# Patient Record
Sex: MALE
[De-identification: names, ages, dates, MRNs, and addresses within clinical notes are randomized per-mention and may not be internally consistent; named-entity substitution may affect disease eponyms.]

---

## 2018-12-07 NOTE — PHYSICAL EXAM
[General Appearance - Well Developed] : well developed [Normal Appearance] : normal appearance [Well Groomed] : well groomed [General Appearance - Well Nourished] : well nourished [No Deformities] : no deformities [General Appearance - In No Acute Distress] : no acute distress [Normal Conjunctiva] : the conjunctiva exhibited no abnormalities [Eyelids - No Xanthelasma] : the eyelids demonstrated no xanthelasmas [Normal Oral Mucosa] : normal oral mucosa [No Oral Pallor] : no oral pallor [No Oral Cyanosis] : no oral cyanosis [Normal Jugular Venous A Waves Present] : normal jugular venous A waves present [Normal Jugular Venous V Waves Present] : normal jugular venous V waves present [No Jugular Venous Gutierrez A Waves] : no jugular venous gutierrez A waves [Respiration, Rhythm And Depth] : normal respiratory rhythm and effort [Exaggerated Use Of Accessory Muscles For Inspiration] : no accessory muscle use [Auscultation Breath Sounds / Voice Sounds] : lungs were clear to auscultation bilaterally [Heart Rate And Rhythm] : heart rate and rhythm were normal [Heart Sounds] : normal S1 and S2 [Murmurs] : no murmurs present [Abdomen Soft] : soft [Abdomen Tenderness] : non-tender [Abdomen Mass (___ Cm)] : no abdominal mass palpated [Abnormal Walk] : normal gait [Gait - Sufficient For Exercise Testing] : the gait was sufficient for exercise testing [Nail Clubbing] : no clubbing of the fingernails [Cyanosis, Localized] : no localized cyanosis [Petechial Hemorrhages (___cm)] : no petechial hemorrhages [Skin Color & Pigmentation] : normal skin color and pigmentation [] : no rash [No Venous Stasis] : no venous stasis [Skin Lesions] : no skin lesions [No Skin Ulcers] : no skin ulcer [No Xanthoma] : no  xanthoma was observed [Oriented To Time, Place, And Person] : oriented to person, place, and time [Affect] : the affect was normal [Mood] : the mood was normal [No Anxiety] : not feeling anxious

## 2018-12-10 ENCOUNTER — NON-APPOINTMENT (OUTPATIENT)
Age: 63
End: 2018-12-10

## 2018-12-10 ENCOUNTER — APPOINTMENT (OUTPATIENT)
Dept: HEART AND VASCULAR | Facility: CLINIC | Age: 63
End: 2018-12-10
Payer: MEDICARE

## 2018-12-10 VITALS
OXYGEN SATURATION: 99 % | WEIGHT: 165 LBS | TEMPERATURE: 98.1 F | HEIGHT: 72 IN | BODY MASS INDEX: 22.35 KG/M2 | HEART RATE: 57 BPM | DIASTOLIC BLOOD PRESSURE: 70 MMHG | SYSTOLIC BLOOD PRESSURE: 130 MMHG

## 2018-12-10 PROCEDURE — 93000 ELECTROCARDIOGRAM COMPLETE: CPT

## 2018-12-10 PROCEDURE — 99214 OFFICE O/P EST MOD 30 MIN: CPT | Mod: 25

## 2018-12-10 NOTE — REASON FOR VISIT
[Follow-Up - Clinic] : a clinic follow-up of [FreeTextEntry1] : Diagnostic Tests:\par ------------------------------------\par ECG:\par 12/10/18: sinus favian at 58bpm, 2 PVCs. \par 04/18/18: sinus bradycardia, otherwise normal. \par 04/11/17: sinus bradycardia, otherwise normal. \par -------------------------------------\par Echo:\par 05/01/18: EF 65%, dilated LA, aortic root 4.2cm, mild-moderate AI. \par 03/22/17: EF 60%, mild-moderate AI, mildly dilated LA, dilated ascending aorta 4.3cm.\par -------------------------------------\par CT:\par 12/04/17: CTA aorta: dilated aortic root and ascending aorta, 4.7cm at sinuses of Valsalva, 4.3 sino-tubular junction, 4.3 at RPA. \par 04/17/17: CTA aorta: dilated aortic root and ascending aorta, 4.7cm at sinuses of Valsalva.\par -------------------------------------\par Stress:\par 12/13/17: exercise stress echo: EF 62%, 14.8 METS, normal wall motion and PA pressures, aortic root 4.2cm, mild AI.

## 2018-12-10 NOTE — HISTORY OF PRESENT ILLNESS
[FreeTextEntry1] : Mr. Lipscomb presents for follow up and management of HIV, HTN, dyslipidemia, aortic regurgitation, and a dilated ascending aorta (4.7cm). His primary care provider, Arsenio Street NP, noted a murmur on cardiac auscultation. He was subsequently referred for an echocardiogram at [facility] on 03/22/17 which revealed an EF of 60%, a mildly dilated left atrium, mild-to-moderate aortic regurgitation, and a dilated ascending aorta measuring 4.3cm. He is very physically fit and exercises daily. He has resting bradycardia as a result of his cardiovascular fitness level. We had an extensive discussion about the results of the echocardiogram and the clinical importance of the dilated ascending aorta. I referred him to a cardiothoracic surgeon, Bc Banegas MD, who specialized in thoracic aortic surgery. He had a CT aortogram on 04/17/17 which revealed a dilated aortic root and ascending aorta measuring 4.7cm at sinuses of Valsalva and a repeat CTA on 12/04/17 which was unchanged.  He had complaints of chest pain and underwent an exercise stress echocardiogram on 12/13/17 which revealed an EF of 62%, 14.8 METS, normal wall motion and PA pressures, aortic root 4.2cm, and mild AI.  He was not able to tolerate low-dose Toprol XL secondary to an episode of weakness and near syncope. He had an echocardiogram on 05/01/18 which revealed an EF of 65%, dilated LA, aortic root 4.2cm, and mild-moderate AI.   He has complaints of left sided inguinal pain and will see a general surgeon at [facility] in the near future to r/o a hernia.

## 2018-12-10 NOTE — ASSESSMENT
[FreeTextEntry1] :  \par 1. HTN: BP not at ACC/AHA 2017 guideline target, not tolerant of low-dose beta blocker secondary to bradycardia\par             - continue telmisartan/hct 80/25 po daily\par 2. Hyperlipemia \par             - continue Lipitor Tablet, 20 MG, 1 tablet, Orally, Once a day (maximum dose given interaction with anti-retroviral therapy)\par \par 3. Aortic regurgitation: mild: s/p echo: 05/01/18: EF 65%, dilated LA, aortic root 4.2cm, mild-moderate AI. \par             - follow up with thoracic aortic center with Bc Banegas MD\par             - will follow with serial echocardiograms (will order next visit)\par \par 4. Dilated aortic root: dilated ascending aorta: 4.3cm (03/22/17), 04/17/17: CTA aorta: dilated aortic root and ascending aorta, 4.7cm at sinuses of Valsalva, 12/04/17: CTA aorta: dilated aortic root and ascending aorta, 4.7cm at sinuses of Valsalva, 4.3 sino-tubular junction, 4.3 at RPA, s/p echo: 05/01/18: EF 65%, dilated LA, aortic root 4.2cm, mild-moderate AI. \par             - not tolerant of low-dose beta blocker secondary to bradycardia\par             - continue following with thoracic aortic center with Bc Banegas MD\par             - will send for an echocardiogram next visit\par \par 5. HIV disease \par             - continue Norvir Capsule, 100 MG, 1 capsule with a meal, Orally, Once a day\par             - continue Sustiva Tablet, 600 MG, Orally, once daily\par             - continue Zovirax Tablet, 800 MG, 1 tablet, Orally, once a day\par             - continue Descovy Tablet, 200-25 MG, Orally\par             - f/u with HIV specialist.

## 2019-04-15 ENCOUNTER — APPOINTMENT (OUTPATIENT)
Dept: HEART AND VASCULAR | Facility: CLINIC | Age: 64
End: 2019-04-15
Payer: MEDICARE

## 2019-04-15 VITALS
BODY MASS INDEX: 22.89 KG/M2 | OXYGEN SATURATION: 100 % | WEIGHT: 169 LBS | HEIGHT: 72 IN | TEMPERATURE: 98 F | HEART RATE: 53 BPM | SYSTOLIC BLOOD PRESSURE: 124 MMHG | DIASTOLIC BLOOD PRESSURE: 63 MMHG

## 2019-04-15 DIAGNOSIS — Z00.00 ENCOUNTER FOR GENERAL ADULT MEDICAL EXAMINATION W/OUT ABNORMAL FINDINGS: ICD-10-CM

## 2019-04-15 PROCEDURE — 99214 OFFICE O/P EST MOD 30 MIN: CPT

## 2019-04-15 RX ORDER — TELMISARTAN AND HYDROCHLOROTHIAZIDE 80; 25 MG/1; MG/1
80-25 TABLET ORAL DAILY
Qty: 90 | Refills: 3 | Status: ACTIVE | COMMUNITY
Start: 1900-01-01 | End: 1900-01-01

## 2019-04-15 NOTE — ASSESSMENT
[FreeTextEntry1] : 1. HTN: BP at ACC/AHA 2017 guideline target, not tolerant of low-dose beta blocker secondary to bradycardia\par             - continue telmisartan/hct 80/25 po daily\par \par 2. Hyperlipemia \par             - continue Lipitor Tablet, 20 MG, 1 tablet, Orally, Once a day (maximum dose given interaction with anti-retroviral therapy)\par \par 3. Aortic regurgitation: mild: s/p echo: 05/01/18: EF 65%, dilated LA, aortic root 4.2cm, mild-moderate AI. \par             - enroll in [facility] thoracic aortic center with Eyal Jenkins MD\par             - will follow with serial echocardiograms (ordered today)\par \par 4. Dilated aortic root: dilated ascending aorta: 4.3cm (03/22/17), 04/17/17: CTA aorta: dilated aortic root and ascending aorta, 4.7cm at sinuses of Valsalva, 12/04/17: CTA aorta: dilated aortic root and ascending aorta, 4.7cm at sinuses of Valsalva, 4.3 sino-tubular junction, 4.3 at RPA, s/p echo: 05/01/18: EF 65%, dilated LA, aortic root 4.2cm, mild-moderate AI. \par             - not tolerant of low-dose beta blocker secondary to bradycardia\par             - will enroll in the [facility] thoracic aortic center with Eyal Jenkins MD\par             - will send for an echocardiogram \par             - will send for a CTCA to evaluate aortic aneurysm and coronary  atherosclerosis\par \par 5. HIV disease \par             - continue Norvir Capsule, 100 MG, 1 capsule with a meal, Orally, Once a day\par             - continue Sustiva Tablet, 600 MG, Orally, once daily\par             - continue Zovirax Tablet, 800 MG, 1 tablet, Orally, once a day\par             - continue Descovy Tablet, 200-25 MG, Orally\par             - f/u with HIV specialist.

## 2019-04-15 NOTE — HISTORY OF PRESENT ILLNESS
[FreeTextEntry1] : Mr. Lipscomb presents for follow up and management of HIV, HTN, dyslipidemia, aortic regurgitation, and a dilated ascending aorta (4.7cm). His primary care provider, Arsenio Street NP, noted a murmur on cardiac auscultation. He was subsequently referred for an echocardiogram at [facility] on 03/22/17 which revealed an EF of 60%, a mildly dilated left atrium, mild-to-moderate aortic regurgitation, and a dilated ascending aorta measuring 4.3cm. He is very physically fit and exercises daily. He has resting bradycardia as a result of his cardiovascular fitness level. We had an extensive discussion about the results of the echocardiogram and the clinical importance of the dilated ascending aorta. I referred him to a cardiothoracic surgeon, Bc Banegas MD, who specialized in thoracic aortic surgery. He had a CT aortogram on 04/17/17 which revealed a dilated aortic root and ascending aorta measuring 4.7cm at sinuses of Valsalva and a repeat CTA on 12/04/17 which was unchanged.  He had complaints of chest pain and underwent an exercise stress echocardiogram on 12/13/17 which revealed an EF of 62%, 14.8 METS, normal wall motion and PA pressures, aortic root 4.2cm, and mild AI.  He was not able to tolerate low-dose Toprol XL secondary to an episode of weakness and near syncope. He had an echocardiogram on 05/01/18 which revealed an EF of 65%, dilated LA, aortic root 4.2cm, and mild-moderate AI.  His HIV specialist may want to alter his ART regimen to help prevent the negative lipid effects.

## 2019-04-15 NOTE — REASON FOR VISIT
[Follow-Up - Clinic] : a clinic follow-up of [FreeTextEntry1] : HIV, HTN, dyslipidemia, aortic regurgitation, and a dilated ascending aorta (4.7cm).

## 2019-05-24 ENCOUNTER — OUTPATIENT (OUTPATIENT)
Dept: OUTPATIENT SERVICES | Facility: HOSPITAL | Age: 64
LOS: 1 days | End: 2019-05-24

## 2019-05-24 ENCOUNTER — APPOINTMENT (OUTPATIENT)
Dept: CT IMAGING | Facility: CLINIC | Age: 64
End: 2019-05-24
Payer: MEDICARE

## 2019-05-24 PROCEDURE — 71275 CT ANGIOGRAPHY CHEST: CPT | Mod: 26

## 2019-07-22 ENCOUNTER — APPOINTMENT (OUTPATIENT)
Dept: HEART AND VASCULAR | Facility: CLINIC | Age: 64
End: 2019-07-22
Payer: MEDICARE

## 2019-07-22 ENCOUNTER — NON-APPOINTMENT (OUTPATIENT)
Age: 64
End: 2019-07-22

## 2019-07-22 VITALS
OXYGEN SATURATION: 97 % | DIASTOLIC BLOOD PRESSURE: 77 MMHG | SYSTOLIC BLOOD PRESSURE: 133 MMHG | TEMPERATURE: 98.3 F | WEIGHT: 167.5 LBS | HEIGHT: 72 IN | BODY MASS INDEX: 22.69 KG/M2 | HEART RATE: 79 BPM

## 2019-07-22 PROCEDURE — 99214 OFFICE O/P EST MOD 30 MIN: CPT | Mod: 25

## 2019-07-22 PROCEDURE — 93000 ELECTROCARDIOGRAM COMPLETE: CPT

## 2019-07-22 NOTE — REASON FOR VISIT
[FreeTextEntry1] : Diagnostic Tests:\par ------------------------------------\par ECG:\par 07/22/19: marked sinus bradycardia at 46bpm, otherwise normal.  \par 12/10/18: sinus favian at 58bpm, 2 PVCs. \par 04/18/18: sinus bradycardia, otherwise normal. \par 04/11/17: sinus bradycardia, otherwise normal. \par -------------------------------------\par Echo:\par 05/01/18: EF 65%, dilated LA, aortic root 4.2cm, mild-moderate AI. \par 03/22/17: EF 60%, mild-moderate AI, mildly dilated LA, dilated ascending aorta 4.3cm.\par -------------------------------------\par CT:\par 05/24/19: CTA chest: aortic root 4.7cm, ascending aorta 4.2cm, moderate LA calcifications. \par 12/04/17: CTA aorta: dilated aortic root and ascending aorta, 4.7cm at sinuses of Valsalva, 4.3 sino-tubular junction, 4.3 at RPA. \par 04/17/17: CTA aorta: dilated aortic root and ascending aorta, 4.7cm at sinuses of Valsalva.\par -------------------------------------\par Stress:\par 12/13/17: exercise stress echo: EF 62%, 14.8 METS, normal wall motion and PA pressures, aortic root 4.2cm, mild AI.

## 2019-08-05 ENCOUNTER — MEDICATION RENEWAL (OUTPATIENT)
Age: 64
End: 2019-08-05

## 2019-08-08 ENCOUNTER — APPOINTMENT (OUTPATIENT)
Dept: HEART AND VASCULAR | Facility: CLINIC | Age: 64
End: 2019-08-08
Payer: MEDICARE

## 2019-08-08 PROCEDURE — 93351 STRESS TTE COMPLETE: CPT

## 2019-08-16 PROBLEM — Z21 HIV POSITIVE: Status: RESOLVED | Noted: 2019-08-16 | Resolved: 2019-08-16

## 2019-08-21 ENCOUNTER — APPOINTMENT (OUTPATIENT)
Dept: CARDIOTHORACIC SURGERY | Facility: CLINIC | Age: 64
End: 2019-08-21
Payer: MEDICARE

## 2019-08-21 VITALS
WEIGHT: 170 LBS | SYSTOLIC BLOOD PRESSURE: 143 MMHG | RESPIRATION RATE: 19 BRPM | TEMPERATURE: 98.1 F | HEART RATE: 49 BPM | HEIGHT: 72 IN | BODY MASS INDEX: 23.03 KG/M2 | OXYGEN SATURATION: 99 % | DIASTOLIC BLOOD PRESSURE: 89 MMHG

## 2019-08-21 DIAGNOSIS — Z21 ASYMPTOMATIC HUMAN IMMUNODEFICIENCY VIRUS [HIV] INFECTION STATUS: ICD-10-CM

## 2019-08-21 DIAGNOSIS — Z87.898 PERSONAL HISTORY OF OTHER SPECIFIED CONDITIONS: ICD-10-CM

## 2019-08-21 PROCEDURE — 99205 OFFICE O/P NEW HI 60 MIN: CPT

## 2019-08-21 NOTE — PHYSICAL EXAM
[General Appearance - In No Acute Distress] : in no acute distress [General Appearance - Alert] : alert [Outer Ear] : the ears and nose were normal in appearance [Sclera] : the sclera and conjunctiva were normal [] : no respiratory distress [Neck Appearance] : the appearance of the neck was normal [Apical Impulse] : the apical impulse was normal [Examination Of The Chest] : the chest was normal in appearance [2+] : left 2+ [No CVA Tenderness] : no ~M costovertebral angle tenderness [Skin Color & Pigmentation] : normal skin color and pigmentation [Skin Turgor] : normal skin turgor [Deep Tendon Reflexes (DTR)] : deep tendon reflexes were 2+ and symmetric [Oriented To Time, Place, And Person] : oriented to person, place, and time [Bowel Sounds] : normal bowel sounds [Abdomen Soft] : soft

## 2019-08-26 NOTE — PROCEDURE
[FreeTextEntry1] : \par Dr. Jenkins discussed activity restrictions with the patient, and would advise exercise at a moderate amount with no heavy lifting over one third of body weight, and avoiding heart rates that exceed 140 beats per minute. Every patient must abstain from tobacco and illicit drug use, especially stimulants such as cocaine or methamphetamine. The patient was also counseled on maintaining a healthy heart diet, and losing any excessive weight as this also put undue stress on both the aorta and entire cardiovascular system. Patient was counseled on the importance of medication adherence for blood pressure control, as hypertension is a significant risk factor associated with aortic dissections. First degree family members should be screened for bicuspid valve disease, and ascending aortic aneurysms.\par \par Dr. Jenkins reviewed the indications for surgery, and used our webpage [email or web address] to illustrate the aorta and anatomy of the heart. Those indications are the following: size greater than 5.0 cm, symptomatic aneurysms, family history of aortic dissection or aneurysm death with a size greater than 4.5 cm, other necessary cardiac procedures such as coronary artery bypass grafting or severe valvular disorders with an aneurysm greater than 4.5 cm, or connective tissue disorders with an aneurysm size greater than 4.5 cm. The patient does not meet size criteria for surgical intervention at the time.

## 2019-08-26 NOTE — CONSULT LETTER
[Dear  ___] : Dear  [unfilled], [Please see my note below.] : Please see my note below. [Sincerely,] : Sincerely, [FreeTextEntry2] : Dr. Nic Urias\par [address] [place] [address], [address]\[place] [address]\par  [FreeTextEntry1] : I had the pleasure of seeing your patient, ELIECER POTTER, in my office today. We take a multidisciplinary team approach to patient care and consider you, the referring physician, an extension of our team. We will maintain an open line of communication with you throughout your patient's treatment course.  \par \par He is being evaluated for an ascending aortic aneurysm. I have reviewed all of the medical records and diagnostic images at the time of his office visit. I have enclosed a copy for your records. \par \par I have reviewed the indications for surgery,and used our webpage [email or web address] <[email or web address]> to illustrate the aorta and anatomy of the heart. The patient does not meet size criteria for surgical intervention at the time. Therefore, I have recommended that the patient will require a follow up appointment in 1 year with a CTA chest to monitor aortic pathology. My office will assist the patient with his upcoming appointment and I will update you on his progress at that time.\par \par I have discussed with the patient that we will continue to monitor his aortic pathology closely at the [facility] at [facility] that encompasses the entire health care system and is one of the largest in the nation at this point.\par \par It is our commitment to provide your patient with the highest quality of advanced therapeutic options. On behalf of the Cardiothoracic Surgery Team, thank you for sending your patient to the [facility] based at [facility].  If there are any questions or concerns, please call me directly at [phone].  \par \par   [FreeTextEntry3] : \par Eyal Jenkins M.D.\par Professor of Cardiovascular and Thoracic Surgery\par Minimally Invasive Valve Surgeon\par Director of Aortic Surgery, [facility]\par Cell: [phone]\par Email: [email or web address] \par \par [facility]:\par [address], [address] Floor, [place] [address]\par Office: [phone]\par Fax: [phone]\par \par [facility]:\par Department of Cardiovascular and Thoracic Surgery\par [address] [place] [address], [address]\par Office: [phone]\par Fax: [phone]\par \par Practice Manager: Ms. Natacha Purcell\par Email: [email or web address]\par Phone: [phone]\par \par

## 2019-08-26 NOTE — HISTORY OF PRESENT ILLNESS
[FreeTextEntry1] : 64 yo male with a PMH of HIV (on treatment, last viral load undetectable), HTN, dyslipidemia, aortic regurgitation, and a dilated ascending aorta, who is referred to Dr. Jenkins for surgical consult.  Patient was referred by Dr. Nic Urias. \par \par 05/24/19: CTA chest: aortic root 4.7cm, ascending aorta 4.2cm, moderate LA calcifications. \par \par 08/08/19: exercise stress echo: EF 62%, dilated aortic root 4.3cm, dilated prox ascending aorta 4.6cm, mild AI, normal wall motion, PA pressures, and ECG post 13.9 METS. \par \par Patient denies any cardiopulmonary symptoms. Patient denies any fever, chills, fatigue, syncope, acute chest pain, palpitations, SOB, orthopnea, paroxysmal nocturnal dyspnea, vomiting, abdominal pain, back pain, BRBPR or swelling to legs. \par \par \par

## 2019-08-26 NOTE — ASSESSMENT
[FreeTextEntry1] : 62 yo male with a PMH of HIV (on treatment, last viral load undetectable), HTN, dyslipidemia, aortic regurgitation, and a dilated ascending aorta, who is referred to Dr. Jenkins for surgical consult.  \par \par The patient's medical records and diagnostic images were reviewed at the time of this office visit, and the following recommendation was made. Patient does not meet criteria for surgical intervention at the time and will be entered into the aortic registry surveillance program.\par \par Plan:\par 1. Continue medication regimen\par 2. Follow up with PCP and cardiologist\par 3. BP control- I have recommended the patient to monitor his blood pressure closely. I have also advised the patient to take daily blood pressures at home and adhere to medication regimen.\par 4. Return to the [facility] in 1 year with a CTA chest

## 2019-08-26 NOTE — DATA REVIEWED
[FreeTextEntry1] : Diagnostic Tests:\par ------------------------------------\par ECG:\par 07/22/19: marked sinus bradycardia at 46bpm, otherwise normal. \par 12/10/18: sinus favian at 58bpm, 2 PVCs. \par 04/18/18: sinus bradycardia, otherwise normal. \par 04/11/17: sinus bradycardia, otherwise normal. \par -------------------------------------\par Echo:\par 05/01/18: EF 65%, dilated LA, aortic root 4.2cm, mild-moderate AI. \par 03/22/17: EF 60%, mild-moderate AI, mildly dilated LA, dilated ascending aorta 4.3cm.\par -------------------------------------\par CT:\par 05/24/19: CTA chest: aortic root 4.7cm, ascending aorta 4.2cm, moderate LA calcifications. \par 12/04/17: CTA aorta: dilated aortic root and ascending aorta, 4.7cm at sinuses of Valsalva, 4.3 sino-tubular junction, 4.3 at RPA. \par 04/17/17: CTA aorta: dilated aortic root and ascending aorta, 4.7cm at sinuses of Valsalva.\par -------------------------------------\par Stress:\par 08/08/19: exercise stress echo: EF 62%, dilated aortic root 4.3cm, dilated prox ascending aorta 4.6cm, mild AI, normal wall motion, PA pressures, and ECG post 13.9 METS. \par 12/13/17: exercise stress echo: EF 62%, 14.8 METS, normal wall motion and PA pressures, aortic root 4.2cm, mild AI.\par -------------------------------------\par Monitor:\par 07/29/19 to 08/05/19: Lifewatch MCT: an HR 54, 79% bradycardia, no pauses. \par

## 2019-10-21 ENCOUNTER — APPOINTMENT (OUTPATIENT)
Dept: HEART AND VASCULAR | Facility: CLINIC | Age: 64
End: 2019-10-21
Payer: MEDICARE

## 2019-10-21 VITALS
OXYGEN SATURATION: 98 % | BODY MASS INDEX: 22.93 KG/M2 | HEART RATE: 55 BPM | WEIGHT: 169.31 LBS | HEIGHT: 72 IN | DIASTOLIC BLOOD PRESSURE: 66 MMHG | SYSTOLIC BLOOD PRESSURE: 125 MMHG

## 2019-10-21 PROCEDURE — 99214 OFFICE O/P EST MOD 30 MIN: CPT

## 2019-10-21 NOTE — REASON FOR VISIT
[FreeTextEntry1] : Diagnostic Tests:\par ------------------------------------\par ECG:\par 07/22/19: marked sinus bradycardia at 46bpm, otherwise normal.  \par 12/10/18: sinus favian at 58bpm, 2 PVCs. \par 04/18/18: sinus bradycardia, otherwise normal. \par 04/11/17: sinus bradycardia, otherwise normal. \par -------------------------------------\par Echo:\par 05/01/18: EF 65%, dilated LA, aortic root 4.2cm, mild-moderate AI. \par 03/22/17: EF 60%, mild-moderate AI, mildly dilated LA, dilated ascending aorta 4.3cm.\par -------------------------------------\par CT:\par 05/24/19: CTA chest: aortic root 4.7cm, ascending aorta 4.2cm, moderate LA calcifications. \par 12/04/17: CTA aorta: dilated aortic root and ascending aorta, 4.7cm at sinuses of Valsalva, 4.3 sino-tubular junction, 4.3 at RPA. \par 04/17/17: CTA aorta: dilated aortic root and ascending aorta, 4.7cm at sinuses of Valsalva.\par -------------------------------------\par Stress:\par 08/08/19: exercise stress echo: EF 62%, dilated aortic root 4.3cm, dilated prox ascending aorta 4.6cm, mild AI, normal wall motion, PA pressures, and ECG post 13.9 METS. \par 12/13/17: exercise stress echo: EF 62%, 14.8 METS, normal wall motion and PA pressures, aortic root 4.2cm, mild AI.\par -------------------------------------\par Monitor:\par 07/29/19 to 08/05/19:  Lifewatch MCT: an HR 54, 79% bradycardia, no pauses.

## 2019-10-21 NOTE — ASSESSMENT
[FreeTextEntry1] : 1. HTN: BP at ACC/AHA 2017 guideline target, not tolerant of low-dose beta blocker secondary to bradycardia\par             - continue telmisartan/hct 80/25mg po daily\par \par 2. Hyperlipemia: LDL mildly above goal\par             - continue Lipitor Tablet, 20 MG, 1 tablet, Orally, Once a day (maximum dose given interaction with antiretroviral therapy)\par \par 3. Aortic regurgitation: s/p 05/24/19: CTA chest: aortic root 4.7cm, ascending aorta 4.2cm, moderate LA calcifications. \par             - follow up [facility] thoracic aortic center with Eyal Jenkins MD\par             - will follow with serial echocardiograms \par \par 4. Dilated aortic root: dilated ascending aorta: s/p 05/24/19: CTA chest: aortic root 4.7cm, ascending aorta 4.2cm, moderate LA calcifications. \par             - not tolerant of low-dose beta blocker secondary to bradycardia\par             - will enroll in the [facility] thoracic aortic center with Eyal Jenkins MD\par             - will follow with serial echocardiograms and periodic CTA\par \par 5. HIV disease \par             - continue Norvir Capsule, 100 MG, 1 capsule with a meal, Orally, Once a day\par             - continue Sustiva Tablet, 600 MG, Orally, once daily\par             - continue Zovirax Tablet, 800 MG, 1 tablet, Orally, once a day\par             - continue Descovy Tablet, 200-25 MG, Orally\par             - f/u with HIV specialist. \par \par 6. Marked sinus bradycardia: likely secondary to high resting vagal tone from CV fitness: + occasional symptoms\par             - avoid HR slowing agents\par             - s/p 07/29/19 to 08/05/19: Lifewatch MCT: an HR 54, 79% bradycardia, no pauses. \par \par

## 2020-01-21 ENCOUNTER — NON-APPOINTMENT (OUTPATIENT)
Age: 65
End: 2020-01-21

## 2020-01-21 ENCOUNTER — APPOINTMENT (OUTPATIENT)
Dept: HEART AND VASCULAR | Facility: CLINIC | Age: 65
End: 2020-01-21
Payer: MEDICARE

## 2020-01-21 VITALS
SYSTOLIC BLOOD PRESSURE: 130 MMHG | OXYGEN SATURATION: 100 % | WEIGHT: 170 LBS | HEART RATE: 49 BPM | BODY MASS INDEX: 23.03 KG/M2 | TEMPERATURE: 98.5 F | HEIGHT: 72 IN | DIASTOLIC BLOOD PRESSURE: 69 MMHG

## 2020-01-21 PROCEDURE — 93000 ELECTROCARDIOGRAM COMPLETE: CPT

## 2020-01-21 PROCEDURE — 99214 OFFICE O/P EST MOD 30 MIN: CPT | Mod: 25

## 2020-01-21 NOTE — ASSESSMENT
[FreeTextEntry1] : 1. HTN: BP at ACC/AHA 2017 guideline target, not tolerant of low-dose beta blocker secondary to bradycardia\par             - continue telmisartan/hct 80/25mg po daily\par \par 2. Hyperlipemia: LDL mildly above goal\par             - continue Lipitor Tablet, 20 MG, 1 tablet, Orally, Once a day (maximum dose given interaction with antiretroviral therapy)\par \par 3. Aortic regurgitation: s/p 05/24/19: CTA chest: aortic root 4.7cm, ascending aorta 4.2cm, moderate LA calcifications.  \par             - follow up [facility] thoracic aortic center with Eyal Jenkins MD (08/2020)\par             - will follow with serial echocardiograms (ordered today)\par \par 4. Dilated aortic root: dilated ascending aorta: s/p 05/24/19: CTA chest: aortic root 4.7cm, ascending aorta 4.2cm, moderate LA calcifications. \par             - not tolerant of low-dose beta blocker secondary to bradycardia\par             - will enroll in the [facility] thoracic aortic center with Eyal Jenkins MD\par             - will follow with serial echocardiograms and periodic CTA\par \par 5. HIV disease \par             - continue Norvir Capsule, 100 MG, 1 capsule with a meal, Orally, Once a day\par             - continue Sustiva Tablet, 600 MG, Orally, once daily\par             - continue Zovirax Tablet, 800 MG, 1 tablet, Orally, once a day\par             - continue Descovy Tablet, 200-25 MG, Orally\par             - f/u with HIV specialist. \par \par 6. Marked sinus bradycardia: likely secondary to high resting vagal tone from CV fitness: + occasional symptoms\par             - avoid HR slowing agents\par             - s/p 07/29/19 to 08/05/19: Lifewatch MCT: an HR 54, 79% bradycardia, no pauses. \par \par

## 2020-01-21 NOTE — PHYSICAL EXAM
[General Appearance - Well Developed] : well developed [Normal Appearance] : normal appearance [Well Groomed] : well groomed [General Appearance - Well Nourished] : well nourished [No Deformities] : no deformities [General Appearance - In No Acute Distress] : no acute distress [Normal Conjunctiva] : the conjunctiva exhibited no abnormalities [Eyelids - No Xanthelasma] : the eyelids demonstrated no xanthelasmas [Normal Oral Mucosa] : normal oral mucosa [No Oral Pallor] : no oral pallor [No Oral Cyanosis] : no oral cyanosis [Normal Jugular Venous A Waves Present] : normal jugular venous A waves present [Normal Jugular Venous V Waves Present] : normal jugular venous V waves present [No Jugular Venous Gutierrez A Waves] : no jugular venous gutierrez A waves [Exaggerated Use Of Accessory Muscles For Inspiration] : no accessory muscle use [Respiration, Rhythm And Depth] : normal respiratory rhythm and effort [Heart Sounds] : normal S1 and S2 [Auscultation Breath Sounds / Voice Sounds] : lungs were clear to auscultation bilaterally [Abdomen Soft] : soft [Murmurs] : no murmurs present [Abdomen Tenderness] : non-tender [Abnormal Walk] : normal gait [Abdomen Mass (___ Cm)] : no abdominal mass palpated [Gait - Sufficient For Exercise Testing] : the gait was sufficient for exercise testing [Nail Clubbing] : no clubbing of the fingernails [Cyanosis, Localized] : no localized cyanosis [Petechial Hemorrhages (___cm)] : no petechial hemorrhages [Skin Color & Pigmentation] : normal skin color and pigmentation [] : no rash [No Venous Stasis] : no venous stasis [No Skin Ulcers] : no skin ulcer [Skin Lesions] : no skin lesions [Oriented To Time, Place, And Person] : oriented to person, place, and time [No Xanthoma] : no  xanthoma was observed [Affect] : the affect was normal [Mood] : the mood was normal [No Anxiety] : not feeling anxious [FreeTextEntry1] : + bradycardia

## 2020-01-21 NOTE — REASON FOR VISIT
[Follow-Up - Clinic] : a clinic follow-up of [FreeTextEntry1] : Diagnostic Tests:\par ------------------------------------\par ECG:\par 01/21/20: marked sinus bradycardia at 49bpm, otherwise normal. \par 07/22/19: marked sinus bradycardia at 46bpm, otherwise normal.  \par 12/10/18: sinus favian at 58bpm, 2 PVCs. \par 04/18/18: sinus bradycardia, otherwise normal. \par 04/11/17: sinus bradycardia, otherwise normal. \par -------------------------------------\par Echo:\par 05/01/18: EF 65%, dilated LA, aortic root 4.2cm, mild-moderate AI. \par 03/22/17: EF 60%, mild-moderate AI, mildly dilated LA, dilated ascending aorta 4.3cm.\par -------------------------------------\par CT:\par 05/24/19: CTA chest: aortic root 4.7cm, ascending aorta 4.2cm, moderate LA calcifications. \par 12/04/17: CTA aorta: dilated aortic root and ascending aorta, 4.7cm at sinuses of Valsalva, 4.3 sino-tubular junction, 4.3 at RPA. \par 04/17/17: CTA aorta: dilated aortic root and ascending aorta, 4.7cm at sinuses of Valsalva.\par -------------------------------------\par Stress:\par 08/08/19: exercise stress echo: EF 62%, dilated aortic root 4.3cm, dilated prox ascending aorta 4.6cm, mild AI, normal wall motion, PA pressures, and ECG post 13.9 METS. \par 12/13/17: exercise stress echo: EF 62%, 14.8 METS, normal wall motion and PA pressures, aortic root 4.2cm, mild AI.\par -------------------------------------\par Monitor:\par 07/29/19 to 08/05/19:  Lifewatch MCT: an HR 54, 79% bradycardia, no pauses.

## 2020-01-21 NOTE — HISTORY OF PRESENT ILLNESS
[FreeTextEntry1] : Mr. Lipscomb presents for follow up and management of HIV, HTN, dyslipidemia, aortic regurgitation, and a dilated ascending aorta (4.7cm). His primary care provider, Arsenio Street NP, noted a murmur on cardiac auscultation. He was subsequently referred for an echocardiogram at [facility] on 03/22/17 which revealed an EF of 60%, a mildly dilated left atrium, mild-to-moderate aortic regurgitation, and a dilated ascending aorta measuring 4.3cm. He is very physically fit and exercises daily. He has resting bradycardia as a result of his cardiovascular fitness level. We had an extensive discussion about the results of the echocardiogram and the clinical importance of the dilated ascending aorta. I referred him to a cardiothoracic surgeon, Bc Banegas MD, who specialized in thoracic aortic surgery. He had a CT aortogram on 04/17/17 which revealed a dilated aortic root and ascending aorta measuring 4.7cm at sinuses of Valsalva and a repeat CTA on 12/04/17 which was unchanged.  He had complaints of chest pain and underwent an exercise stress echocardiogram on 12/13/17 which revealed an EF of 62%, 14.8 METS, normal wall motion and PA pressures, aortic root 4.2cm, and mild AI.  He was not able to tolerate low-dose Toprol XL secondary to an episode of weakness and near syncope. He had an echocardiogram on 05/01/18 which revealed an EF of 65%, dilated LA, aortic root 4.2cm, and mild-moderate AI.  He had a cardiac CT on 05/24/19 which revealed aortic root 4.7cm, ascending aorta 4.2cm, and moderate LA calcifications. His HIV specialist may want to alter his ART regimen to help prevent the negative lipid effects.  He had a CTA chest on 05/24/19 which revealed an aortic root of 4.7cm, ascending aorta 4.2cm, moderate LA calcifications. He had an exercise stress echocardiogram on 08/08/19 which revealed an EF 62%, dilated aortic root 4.3cm, dilated prox ascending aorta 4.6cm, mild AI, normal wall motion, PA pressures, and ECG post 13.9 METS.   Since his last visit, he saw Hussein Jenkins MD and was enrolled in the thoracic aortic aneurysm surveillance center with a recommendation for repeat CTA in 1 year.  He has complaints of insomnia but does admit to taking a nap daily.

## 2020-06-01 ENCOUNTER — APPOINTMENT (OUTPATIENT)
Dept: HEART AND VASCULAR | Facility: CLINIC | Age: 65
End: 2020-06-01
Payer: MEDICARE

## 2020-06-01 VITALS
TEMPERATURE: 98.7 F | DIASTOLIC BLOOD PRESSURE: 71 MMHG | BODY MASS INDEX: 22.89 KG/M2 | SYSTOLIC BLOOD PRESSURE: 135 MMHG | OXYGEN SATURATION: 100 % | WEIGHT: 169 LBS | HEART RATE: 47 BPM | HEIGHT: 72 IN

## 2020-06-01 PROCEDURE — 99214 OFFICE O/P EST MOD 30 MIN: CPT

## 2020-06-01 PROCEDURE — 93306 TTE W/DOPPLER COMPLETE: CPT | Mod: 59

## 2020-06-01 RX ORDER — CYCLOSPORINE 0.5 MG/ML
0.05 EMULSION OPHTHALMIC
Qty: 180 | Refills: 0 | Status: ACTIVE | COMMUNITY
Start: 2020-05-29

## 2020-06-01 RX ORDER — ALBUTEROL SULFATE 90 UG/1
108 (90 BASE) INHALANT RESPIRATORY (INHALATION)
Qty: 18 | Refills: 0 | Status: ACTIVE | COMMUNITY
Start: 2020-04-01

## 2020-06-01 NOTE — PHYSICAL EXAM
[General Appearance - Well Developed] : well developed [Normal Appearance] : normal appearance [Well Groomed] : well groomed [General Appearance - Well Nourished] : well nourished [No Deformities] : no deformities [General Appearance - In No Acute Distress] : no acute distress [Normal Conjunctiva] : the conjunctiva exhibited no abnormalities [Eyelids - No Xanthelasma] : the eyelids demonstrated no xanthelasmas [Normal Oral Mucosa] : normal oral mucosa [No Oral Pallor] : no oral pallor [No Oral Cyanosis] : no oral cyanosis [Normal Jugular Venous A Waves Present] : normal jugular venous A waves present [Normal Jugular Venous V Waves Present] : normal jugular venous V waves present [No Jugular Venous Gutierrez A Waves] : no jugular venous gutierrez A waves [Respiration, Rhythm And Depth] : normal respiratory rhythm and effort [Exaggerated Use Of Accessory Muscles For Inspiration] : no accessory muscle use [Auscultation Breath Sounds / Voice Sounds] : lungs were clear to auscultation bilaterally [Heart Sounds] : normal S1 and S2 [Murmurs] : no murmurs present [Abdomen Soft] : soft [Abdomen Tenderness] : non-tender [Abdomen Mass (___ Cm)] : no abdominal mass palpated [Abnormal Walk] : normal gait [Gait - Sufficient For Exercise Testing] : the gait was sufficient for exercise testing [Nail Clubbing] : no clubbing of the fingernails [Cyanosis, Localized] : no localized cyanosis [Petechial Hemorrhages (___cm)] : no petechial hemorrhages [Skin Color & Pigmentation] : normal skin color and pigmentation [] : no rash [No Venous Stasis] : no venous stasis [Skin Lesions] : no skin lesions [No Skin Ulcers] : no skin ulcer [No Xanthoma] : no  xanthoma was observed [Oriented To Time, Place, And Person] : oriented to person, place, and time [Affect] : the affect was normal [Mood] : the mood was normal [No Anxiety] : not feeling anxious [FreeTextEntry1] : + bradycardia

## 2020-06-01 NOTE — REASON FOR VISIT
[Follow-Up - Clinic] : a clinic follow-up of [FreeTextEntry1] : Diagnostic Tests:\par ------------------------------------\par ECG:\par 01/21/20: marked sinus bradycardia at 49bpm, otherwise normal. \par 07/22/19: marked sinus bradycardia at 46bpm, otherwise normal.  \par 12/10/18: sinus favian at 58bpm, 2 PVCs. \par 04/18/18: sinus bradycardia, otherwise normal. \par 04/11/17: sinus bradycardia, otherwise normal. \par -------------------------------------\par Echo:\par 06/01/20: EF 66%, dilated aortic root 4.2cm, moderate AI.  \par 05/01/18: EF 65%, dilated LA, aortic root 4.2cm, mild-moderate AI. \par 03/22/17: EF 60%, mild-moderate AI, mildly dilated LA, dilated ascending aorta 4.3cm.\par -------------------------------------\par CT:\par 05/24/19: CTA chest: aortic root 4.7cm, ascending aorta 4.2cm, moderate LA calcifications. \par 12/04/17: CTA aorta: dilated aortic root and ascending aorta, 4.7cm at sinuses of Valsalva, 4.3 sino-tubular junction, 4.3 at RPA. \par 04/17/17: CTA aorta: dilated aortic root and ascending aorta, 4.7cm at sinuses of Valsalva.\par -------------------------------------\par Stress:\par 08/08/19: exercise stress echo: EF 62%, dilated aortic root 4.3cm, dilated prox ascending aorta 4.6cm, mild AI, normal wall motion, PA pressures, and ECG post 13.9 METS. \par 12/13/17: exercise stress echo: EF 62%, 14.8 METS, normal wall motion and PA pressures, aortic root 4.2cm, mild AI.\par -------------------------------------\par Monitor:\par 07/29/19 to 08/05/19:  Lifewatch MCT: an HR 54, 79% bradycardia, no pauses.

## 2020-06-01 NOTE — ASSESSMENT
[FreeTextEntry1] : 1. HTN: BP at ACC/AHA 2017 guideline target, not tolerant of low-dose beta blocker secondary to bradycardia\par             - continue telmisartan/hct 80/25mg po daily\par \par 2. Hyperlipemia: LDL mildly above goal\par             - continue Lipitor Tablet, 20 MG, 1 tablet, Orally, Once a day (maximum dose given interaction with antiretroviral therapy)\par \par 3. Aortic regurgitation: s/p echo: EF 66%, dilated aortic root 4.2cm, moderate AI. \par             - follow up [facility] thoracic aortic center with Eyal Jenkins MD (08/2020)\par             - will follow with serial echocardiograms \par \par 4. Dilated aortic root: dilated ascending aorta: s/p 05/24/19: CTA chest: aortic root 4.7cm, ascending aorta 4.2cm, moderate LA calcifications. \par             - not tolerant of low-dose beta blocker secondary to bradycardia\par             - follow up with [facility] thoracic aortic center with Eyal Jenkins MD\par             - will follow with serial echocardiograms and periodic CTA\par \par 5. HIV disease \par             - continue Norvir Capsule, 100 MG, 1 capsule with a meal, Orally, Once a day\par             - continue Sustiva Tablet, 600 MG, Orally, once daily\par             - continue Zovirax Tablet, 800 MG, 1 tablet, Orally, once a day\par             - continue Descovy Tablet, 200-25 MG, Orally\par             - f/u with HIV specialist. \par \par 6. Marked sinus bradycardia: likely secondary to high resting vagal tone from CV fitness: + occasional symptoms\par             - avoid HR slowing agents\par             - s/p 07/29/19 to 08/05/19: Lifewatch MCT: an HR 54, 79% bradycardia, no pauses. \par \par 7. Possible KERLINE: \par              - he will provide copies of his lab work\par \par

## 2020-06-01 NOTE — HISTORY OF PRESENT ILLNESS
[FreeTextEntry1] : Mr. Lipscomb presents for follow up and management of HIV, HTN, dyslipidemia, aortic regurgitation, and a dilated ascending aorta (4.7cm). His primary care provider, Arsenio Street NP, noted a murmur on cardiac auscultation. He was subsequently referred for an echocardiogram at [facility] on 03/22/17 which revealed an EF of 60%, a mildly dilated left atrium, mild-to-moderate aortic regurgitation, and a dilated ascending aorta measuring 4.3cm. He is very physically fit and exercises daily. He has resting bradycardia as a result of his cardiovascular fitness level. We had an extensive discussion about the results of the echocardiogram and the clinical importance of the dilated ascending aorta. I referred him to a cardiothoracic surgeon, Bc Banegas MD, who specialized in thoracic aortic surgery.  He had a CT aortogram on 04/17/17 which revealed a dilated aortic root and ascending aorta measuring 4.7cm at sinuses of Valsalva and a repeat CTA on 12/04/17 which was unchanged.  He had complaints of chest pain and underwent an exercise stress echocardiogram on 12/13/17 which revealed an EF of 62%, 14.8 METS, normal wall motion and PA pressures, aortic root 4.2cm, and mild AI.  He was not able to tolerate low-dose Toprol XL secondary to an episode of weakness and near syncope.  He had an echocardiogram on 05/01/18 which revealed an EF of 65%, dilated LA, aortic root 4.2cm, and mild-moderate AI.  He had a cardiac CT on 05/24/19 which revealed aortic root 4.7cm, ascending aorta 4.2cm, and moderate LA calcifications. His HIV specialist may want to alter his ART regimen to help prevent the negative lipid effects.  He had a CTA chest on 05/24/19 which revealed an aortic root of 4.7cm, ascending aorta 4.2cm, moderate LA calcifications. He had an exercise stress echocardiogram on 08/08/19 which revealed an EF 62%, dilated aortic root 4.3cm, dilated prox ascending aorta 4.6cm, mild AI, normal wall motion, PA pressures, and ECG post 13.9 METS.   He is now enrolled in the thoracic aortic aneurysm surveillance center.  He had an echocardiogram today (06/01/20) which revealed an EF of 66%, dilated aortic root 4.2cm, and moderate AI.  Since his last visit he has been well.  He will be seeing Dr. Jenkins this month.  His recent lab work revealed mild KERLINE (per patient) and he is following with his PMD.

## 2020-08-04 ENCOUNTER — RESULT REVIEW (OUTPATIENT)
Age: 65
End: 2020-08-04

## 2020-08-04 ENCOUNTER — APPOINTMENT (OUTPATIENT)
Dept: CT IMAGING | Facility: CLINIC | Age: 65
End: 2020-08-04
Payer: MEDICARE

## 2020-08-04 ENCOUNTER — OUTPATIENT (OUTPATIENT)
Dept: OUTPATIENT SERVICES | Facility: HOSPITAL | Age: 65
LOS: 1 days | End: 2020-08-04

## 2020-08-04 PROCEDURE — 71275 CT ANGIOGRAPHY CHEST: CPT | Mod: 26

## 2020-08-05 ENCOUNTER — APPOINTMENT (OUTPATIENT)
Dept: CARDIOTHORACIC SURGERY | Facility: CLINIC | Age: 65
End: 2020-08-05
Payer: MEDICARE

## 2020-08-05 VITALS
OXYGEN SATURATION: 99 % | RESPIRATION RATE: 18 BRPM | TEMPERATURE: 97 F | HEART RATE: 48 BPM | DIASTOLIC BLOOD PRESSURE: 79 MMHG | SYSTOLIC BLOOD PRESSURE: 148 MMHG

## 2020-08-05 PROCEDURE — 99214 OFFICE O/P EST MOD 30 MIN: CPT

## 2020-08-14 NOTE — PHYSICAL EXAM
[Neck Appearance] : the appearance of the neck was normal [Sclera] : the sclera and conjunctiva were normal [] : no respiratory distress [Apical Impulse] : the apical impulse was normal [Examination Of The Chest] : the chest was normal in appearance [2+] : left 2+ [Bowel Sounds] : normal bowel sounds [Abdomen Soft] : soft [Cervical Lymph Nodes Enlarged Posterior Bilaterally] : posterior cervical [No CVA Tenderness] : no ~M costovertebral angle tenderness [Abnormal Walk] : normal gait [Nail Clubbing] : no clubbing  or cyanosis of the fingernails [Musculoskeletal - Swelling] : no joint swelling seen [Motor Tone] : muscle strength and tone were normal [Skin Color & Pigmentation] : normal skin color and pigmentation [Skin Turgor] : normal skin turgor [Deep Tendon Reflexes (DTR)] : deep tendon reflexes were 2+ and symmetric [Oriented To Time, Place, And Person] : oriented to person, place, and time [General Appearance - Alert] : alert [General Appearance - In No Acute Distress] : in no acute distress [Outer Ear] : the ears and nose were normal in appearance [FreeTextEntry1] : deferred

## 2020-08-14 NOTE — CONSULT LETTER
[Dear  ___] : Dear  [unfilled], [FreeTextEntry1] : I had the pleasure of seeing your patient, ELIECER POTTER, in my office today. We take a multidisciplinary team approach to patient care and consider you, the referring physician, an extension of our team. We will maintain an open line of communication with you throughout your patient's treatment course.  \par \par Mr. POTTER presents for one year follow up visit for evaluation and management of thoracic aortic aneurysm. I have reviewed all of his medical records and diagnostic images at the time of his office consultation. I have enclosed a copy for your records. \par \par I have reviewed the indications for surgery,and used our webpage [email or web address] <[email or web address]> to illustrate the aorta and anatomy of the heart. The patient does not meet size criteria for surgical intervention at the time. Review of the imaging shows his  aortic pathology has remained stable. Therefore, I have recommended that the patient will require a follow up appointment in one year with CTA chest to monitor aortic pathology. My office will assist the patient with his upcoming appointment and I will update you on his progress at that time.\par \par I have discussed with the patient that we will continue to monitor his aortic pathology closely at the [facility] at [facility] that encompasses the entire health care system and is one of the largest in the nation at this point.\par \par It is our commitment to provide your patient with the highest quality of advanced therapeutic options. On behalf of the Cardiothoracic Surgery Team, thank you for sending your patient to the [facility] based at [facility].  If there are any questions or concerns, please call me directly at [phone].  \par \par Please see my note below. \par \par Sincerely, \par \par \par \par \par \par Eyal Jenkins M.D.\par Professor of Cardiovascular and Thoracic Surgery\par Minimally Invasive Valve Surgeon\par Director of Aortic Surgery, [facility]\par Cell: [phone]\par Email: [email or web address] \par \par [facility]:\par [address], [address], [place] [address]\par Office: [phone]\par Fax: [phone]\par \par [facility]:\par Department of Cardiovascular and Thoracic Surgery\par [address] [place] [address], [address]\par Office: [phone]\par Fax: [phone]\par \par Practice Manager: Ms. Natacha Purcell\par Email: [email or web address]\par Phone: [phone]\par  [FreeTextEntry2] : Nic Urias MD\par [address] [place] [address], [address]\[place] [address]\par

## 2020-08-14 NOTE — HISTORY OF PRESENT ILLNESS
[FreeTextEntry1] : 63 yo male with a PMH of HIV (on treatment, last viral load undetectable), HTN, dyslipidemia, aortic regurgitation, and a dilated ascending aorta, present today for a followup visit with repeat diagnostic imaging. \par \par CTA chest 8/4/20:\par 1.  Since May 24, 2019, unchanged thoracic aortic measurements as above. Unchanged dilatation aortic root, up to 4.7 cm.\par 2.  Moderate multivessel coronary artery calcific atherosclerosis.\par \par Echo 6/1/20: EF normal, moderate AR (mildly increased since 2018), aortic root 4.2cm. \par \par Patient is doing well and denies recent hospitalization, ER visits, or surgeries. He  denies fever, chills, fatigue, headache, blurred vision, dizziness, syncope, chest pain, palpitations, shortness of breath, orthopnea, paroxysmal nocturnal dyspnea, nausea, vomiting, abdominal pain, back pain, BRBPR or swelling to legs. He continues to bike/cardio exercise 3x/week without cardiopulmonary symptoms. \par \par

## 2020-08-14 NOTE — DATA REVIEWED
[FreeTextEntry1] : \par CTA chest 5/24/19: aortic root 4.7cm, ascending aorta 4.2cm, moderate LA calcifications. \par \par exercise stress echo 08/08/19: EF 62%, dilated aortic root 4.3cm, dilated prox ascending aorta 4.6cm, mild AI, normal wall motion, PA pressures, and ECG post 13.9 METS. \par \par Echo 6/1/20: EF normal, moderate AR (mildly increased since 2018), aortic root 4.2cm.

## 2020-08-14 NOTE — ASSESSMENT
[FreeTextEntry1] : 63 yo male with a PMH of HIV (on treatment, last viral load undetectable), HTN, dyslipidemia, aortic regurgitation, and a dilated ascending aorta, present today for a followup visit with repeat diagnostic imaging. \par \par The patient's medical records and diagnostic images were reviewed at the time of this office visit, and the following recommendation was made. Review of the imaging shows aortic pathology has remained stable and does not require surgical intervention at this time.\par \par Plan:\par 1. Continue medication regimen\par 2. Follow up with PCP and cardiologist\par 3. BP control- I have recommended the patient to monitor his blood pressure closely. I have also advised the patient to take daily blood pressures at home and adhere to medication regimen.\par 4. Return to the [facility] in one year with gated CTA chest. \par

## 2020-08-14 NOTE — END OF VISIT
[FreeTextEntry3] : \par I, BUBBA SALEEMU , am scribing for and in the presence of ABY ADORNO the following sections: History of present illness, past Medical/family/surgical/family/social history, review of systems, vital signs, physical exam and disposition.\par \par I personally performed the services described in the documentation, reviewed the documentation recorded by the scribe in my presence and it accurately and completely records my words and actions.\par \par \par \par

## 2020-08-14 NOTE — PROCEDURE
[FreeTextEntry1] : Dr. Jenkins reviewed the indications for surgery, and used our webpage [email or web address] <[email or web address]> to illustrate the aorta and anatomy of the heart. Those indications are the following: size greater than 5.0 cm, symptomatic aneurysms, family history of aortic dissection or aneurysm death with a size greater than 4.5 cm, other necessary cardiac procedures such as coronary artery bypass grafting or valvular disorders with an aneurysm greater than 4.5 cm, or connective tissue disorders with an aneurysm size greater than 4.5 cm. The patient does not meet size criteria for surgical intervention at the time.\par \par Dr. Jenkins discussed activity restrictions with the patient, and would advise exercise at a moderate amount with no heavy lifting over one third of body weight, and avoiding heart rates that exceed 140 beats per minute. In addition, every patient should abstain from tobacco abuse and to avoid all illicit drug use, especially stimulants such as cocaine or methamphetamine. Dr. Jenkins also counseled regarding maintaining a healthy heart diet, and losing any excessive weight as this also put undue stress on both the aorta and entire cardiovascular system. First degree family members should be screened for bicuspid valve disease, and ascending aortic aneurysms. \par \par Patient was advised to view the educational video prior to this visit regarding aortic pathology, risk factors, surgical procedures, and lifestyle modifications. Video can be retrieved at [email or web address].\par \par

## 2020-12-07 ENCOUNTER — APPOINTMENT (OUTPATIENT)
Dept: HEART AND VASCULAR | Facility: CLINIC | Age: 65
End: 2020-12-07
Payer: MEDICARE

## 2020-12-07 VITALS
HEART RATE: 71 BPM | HEIGHT: 73 IN | OXYGEN SATURATION: 100 % | SYSTOLIC BLOOD PRESSURE: 132 MMHG | WEIGHT: 175.6 LBS | DIASTOLIC BLOOD PRESSURE: 72 MMHG | BODY MASS INDEX: 23.27 KG/M2

## 2020-12-07 PROCEDURE — 99214 OFFICE O/P EST MOD 30 MIN: CPT

## 2020-12-07 PROCEDURE — 99072 ADDL SUPL MATRL&STAF TM PHE: CPT

## 2020-12-07 NOTE — ASSESSMENT
[FreeTextEntry1] : 1. HTN: BP near ACC/AHA 2017 guideline target, not tolerant of low-dose beta blocker secondary to bradycardia\par             - continue telmisartan/hct 80/25mg po daily\par \par 2. Hyperlipemia: LDL mildly above goal\par             - continue Lipitor Tablet, 20 MG, 1 tablet, Orally, Once a day (maximum dose given interaction with antiretroviral therapy)\par              - discussed therapeutic lifestyle changes to promote improved lipid metabolism \par \par 3. Aortic regurgitation: s/p echo: EF 66%, dilated aortic root 4.2cm, moderate AI, s/p CTA chest 08/02/20: aortic root 4.7cm, ascending aorta 4.2cm, moderate LA calcifications.\par             - follow up [facility] thoracic aortic center with Eyal Jenkins MD (08/2020)\par             - will follow with serial echocardiograms \par \par 4. Dilated aortic root: dilated ascending aorta: s/p 05/24/19: CTA chest: aortic root 4.7cm, ascending aorta 4.2cm, moderate LA calcifications. \par             - not tolerant of low-dose beta blocker secondary to bradycardia\par             - follow up with [facility] thoracic aortic center with Eyal Jenkins MD\par             - will follow with serial echocardiograms and periodic CTA\par \par 5. HIV disease:  \par             - continue Norvir Capsule, 100 MG, 1 capsule with a meal, Orally, Once a day\par             - continue Sustiva Tablet, 600 MG, Orally, once daily\par             - continue Zovirax Tablet, 800 MG, 1 tablet, Orally, once a day\par             - continue Descovy Tablet, 200-25 MG, Orally\par             - f/u with HIV specialist. \par \par 6. Marked sinus bradycardia: likely secondary to high resting vagal tone from CV fitness: + occasional symptoms\par             - avoid HR slowing agents\par             - s/p 07/29/19 to 08/05/19: Lifewatch MCT: an HR 54, 79% bradycardia, no pauses. \par \par 7. CKD III: Cr 1.43 (06/02/20)\par             - avoid nephrotoxic agents\par             - check repeat lab work next visit\par             - will send to nephrologist if Cr remains elevated\par \par

## 2020-12-07 NOTE — HISTORY OF PRESENT ILLNESS
[FreeTextEntry1] : Mr. Lipscomb presents for follow up and management of HIV, HTN, dyslipidemia, CKD III, aortic regurgitation, and a dilated ascending aorta (4.7cm). His primary care provider, Arsenio Street NP, noted a murmur on cardiac auscultation. He was subsequently referred for an echocardiogram at [facility] on 03/22/17 which revealed an EF of 60%, a mildly dilated left atrium, mild-to-moderate aortic regurgitation, and a dilated ascending aorta measuring 4.3cm. He is very physically fit and exercises daily. He has resting bradycardia as a result of his cardiovascular fitness level. We had an extensive discussion about the results of the echocardiogram and the clinical importance of the dilated ascending aorta. I referred him to a cardiothoracic surgeon, Bc Banegas MD, who specialized in thoracic aortic surgery.  He had a CT aortogram on 04/17/17 which revealed a dilated aortic root and ascending aorta measuring 4.7cm at sinuses of Valsalva and a repeat CTA on 12/04/17 which was unchanged.  He had complaints of chest pain and underwent an exercise stress echocardiogram on 12/13/17 which revealed an EF of 62%, 14.8 METS, normal wall motion and PA pressures, aortic root 4.2cm, and mild AI.  He was not able to tolerate low-dose Toprol XL secondary to an episode of weakness and near syncope.  He had an echocardiogram on 05/01/18 which revealed an EF of 65%, dilated LA, aortic root 4.2cm, and mild-moderate AI.  He had a cardiac CT on 05/24/19 which revealed aortic root 4.7cm, ascending aorta 4.2cm, and moderate LA calcifications. His HIV specialist may want to alter his ART regimen to help prevent the negative lipid effects.  He had a CTA chest on 05/24/19 which revealed an aortic root of 4.7cm, ascending aorta 4.2cm, moderate LA calcifications. He had an exercise stress echocardiogram on 08/08/19 which revealed an EF 62%, dilated aortic root 4.3cm, dilated prox ascending aorta 4.6cm, mild AI, normal wall motion, PA pressures, and ECG post 13.9 METS.  He is enrolled in the thoracic aortic aneurysm surveillance center.  He had an echocardiogram today (06/01/20) which revealed an EF of 66%, dilated aortic root 4.2cm, and moderate AI.  Since his last visit he has been well.   He has complaints of diffuse itching and is following with a dermatologist.

## 2020-12-07 NOTE — REASON FOR VISIT
[Follow-Up - Clinic] : a clinic follow-up of [FreeTextEntry1] : Diagnostic Tests:\par ------------------------------------\par ECG:\par 01/21/20: marked sinus bradycardia at 49bpm, otherwise normal. \par 07/22/19: marked sinus bradycardia at 46bpm, otherwise normal.  \par 12/10/18: sinus favian at 58bpm, 2 PVCs. \par 04/18/18: sinus bradycardia, otherwise normal. \par 04/11/17: sinus bradycardia, otherwise normal. \par -------------------------------------\par Echo:\par 06/01/20: EF 66%, dilated aortic root 4.2cm, moderate AI.  \par 05/01/18: EF 65%, dilated LA, aortic root 4.2cm, mild-moderate AI. \par 03/22/17: EF 60%, mild-moderate AI, mildly dilated LA, dilated ascending aorta 4.3cm.\par -------------------------------------\par CT:\par 08/04/20: CTA chest: aortic root 4.7cm, ascending aorta 4.2cm, moderate LA calcifications.\par 05/24/19: CTA chest: aortic root 4.7cm, ascending aorta 4.2cm, moderate LA calcifications. \par 12/04/17: CTA aorta: dilated aortic root and ascending aorta, 4.7cm at sinuses of Valsalva, 4.3 sino-tubular junction, 4.3 at RPA. \par 04/17/17: CTA aorta: dilated aortic root and ascending aorta, 4.7cm at sinuses of Valsalva.\par -------------------------------------\par Stress:\par 08/08/19: exercise stress echo: EF 62%, dilated aortic root 4.3cm, dilated prox ascending aorta 4.6cm, mild AI, normal wall motion, PA pressures, and ECG post 13.9 METS. \par 12/13/17: exercise stress echo: EF 62%, 14.8 METS, normal wall motion and PA pressures, aortic root 4.2cm, mild AI.\par -------------------------------------\par Monitor:\par 07/29/19 to 08/05/19:  Lifewatch MCT: an HR 54, 79% bradycardia, no pauses.

## 2021-04-06 ENCOUNTER — NON-APPOINTMENT (OUTPATIENT)
Age: 66
End: 2021-04-06

## 2021-04-06 ENCOUNTER — APPOINTMENT (OUTPATIENT)
Dept: HEART AND VASCULAR | Facility: CLINIC | Age: 66
End: 2021-04-06
Payer: MEDICARE

## 2021-04-06 VITALS
WEIGHT: 172 LBS | OXYGEN SATURATION: 98 % | HEART RATE: 51 BPM | DIASTOLIC BLOOD PRESSURE: 64 MMHG | TEMPERATURE: 98.3 F | BODY MASS INDEX: 22.8 KG/M2 | SYSTOLIC BLOOD PRESSURE: 121 MMHG | HEIGHT: 73 IN

## 2021-04-06 PROCEDURE — 99214 OFFICE O/P EST MOD 30 MIN: CPT | Mod: 25

## 2021-04-06 PROCEDURE — 93000 ELECTROCARDIOGRAM COMPLETE: CPT

## 2021-04-06 PROCEDURE — 99072 ADDL SUPL MATRL&STAF TM PHE: CPT

## 2021-04-06 NOTE — HISTORY OF PRESENT ILLNESS
[FreeTextEntry1] : Mr. Lipscomb presents for follow up and management of HIV, HTN, dyslipidemia, CKD III, aortic regurgitation, and a dilated ascending aorta (4.7cm). His primary care provider, Arsenio Street NP, noted a murmur on cardiac auscultation. He was subsequently referred for an echocardiogram at [facility] on 03/22/17 which revealed an EF of 60%, a mildly dilated left atrium, mild-to-moderate aortic regurgitation, and a dilated ascending aorta measuring 4.3cm. He is very physically fit and exercises daily. He has resting bradycardia as a result of his cardiovascular fitness level. We had an extensive discussion about the results of the echocardiogram and the clinical importance of the dilated ascending aorta. I referred him to a cardiothoracic surgeon, Bc Banegas MD, who specialized in thoracic aortic surgery.  He had a CT aortogram on 04/17/17 which revealed a dilated aortic root and ascending aorta measuring 4.7cm at sinuses of Valsalva and a repeat CTA on 12/04/17 which was unchanged.  He had complaints of chest pain and underwent an exercise stress echocardiogram on 12/13/17 which revealed an EF of 62%, 14.8 METS, normal wall motion and PA pressures, aortic root 4.2cm, and mild AI.  He was not able to tolerate low-dose Toprol XL secondary to an episode of weakness and near syncope.  He had an echocardiogram on 05/01/18 which revealed an EF of 65%, dilated LA, aortic root 4.2cm, and mild-moderate AI.  He had a cardiac CT on 05/24/19 which revealed aortic root 4.7cm, ascending aorta 4.2cm, and moderate LA calcifications. His HIV specialist may want to alter his ART regimen to help prevent the negative lipid effects.  He had a CTA chest on 05/24/19 which revealed an aortic root of 4.7cm, ascending aorta 4.2cm, moderate LA calcifications. He had an exercise stress echocardiogram on 08/08/19 which revealed an EF 62%, dilated aortic root 4.3cm, dilated prox ascending aorta 4.6cm, mild AI, normal wall motion, PA pressures, and ECG post 13.9 METS.  He is enrolled in the thoracic aortic aneurysm surveillance center.  He had an echocardiogram on 06/01/20 which revealed an EF of 66%, dilated aortic root 4.2cm, and moderate AI.  Since his last visit he has been well.  He has received both doses of the Pfizer COVID-19 vaccine.

## 2021-04-06 NOTE — REASON FOR VISIT
[FreeTextEntry1] : Diagnostic Tests:\par ------------------------------------\par ECG:\par 04/06/21: sinus bradycardia at 58 bpm, single PVC. \par 01/21/20: marked sinus bradycardia at 49bpm, otherwise normal. \par 07/22/19: marked sinus bradycardia at 46bpm, otherwise normal.  \par 12/10/18: sinus favian at 58bpm, 2 PVCs. \par 04/18/18: sinus bradycardia, otherwise normal. \par 04/11/17: sinus bradycardia, otherwise normal. \par -------------------------------------\par Echo:\par 06/01/20: EF 66%, dilated aortic root 4.2cm, moderate AI.  \par 05/01/18: EF 65%, dilated LA, aortic root 4.2cm, mild-moderate AI. \par 03/22/17: EF 60%, mild-moderate AI, mildly dilated LA, dilated ascending aorta 4.3cm.\par -------------------------------------\par CT:\par 08/04/20: CTA chest: aortic root 4.7cm, ascending aorta 4.2cm, moderate LA calcifications.\par 05/24/19: CTA chest: aortic root 4.7cm, ascending aorta 4.2cm, moderate LA calcifications. \par 12/04/17: CTA aorta: dilated aortic root and ascending aorta, 4.7cm at sinuses of Valsalva, 4.3 sino-tubular junction, 4.3 at RPA. \par 04/17/17: CTA aorta: dilated aortic root and ascending aorta, 4.7cm at sinuses of Valsalva.\par -------------------------------------\par Stress:\par 08/08/19: exercise stress echo: EF 62%, dilated aortic root 4.3cm, dilated prox ascending aorta 4.6cm, mild AI, normal wall motion, PA pressures, and ECG post 13.9 METS. \par 12/13/17: exercise stress echo: EF 62%, 14.8 METS, normal wall motion and PA pressures, aortic root 4.2cm, mild AI.\par -------------------------------------\par Monitor:\par 07/29/19 to 08/05/19:  Lifewatch MCT: an HR 54, 79% bradycardia, no pauses.

## 2021-04-06 NOTE — ASSESSMENT
[FreeTextEntry1] : 1. HTN: BP at ACC/AHA 2017 guideline target, not tolerant of low-dose beta blocker secondary to bradycardia\par             - continue telmisartan/hct 80/25mg po daily\par \par 2. Hyperlipemia: LDL at goal,  (06/02/20)\par             - continue atorvastatin 20mg po qd (maximum dose given interaction with antiretroviral therapy)\par             - discussed therapeutic lifestyle changes to promote improved lipid metabolism \par             - patient will provide copy of recent lab work from PMD's office for my review. \par \par 3. Aortic regurgitation: s/p echo: EF 66%, dilated aortic root 4.2cm, moderate AI, s/p CTA chest 08/02/20: aortic root 4.7cm, ascending aorta 4.2cm, moderate LA calcifications.\par             - follow up [facility] thoracic aortic center with Eyal Jenkins MD (08/2020)\par             - will follow with serial echocardiograms (ordered today)\par \par 4. Dilated aortic root: dilated ascending aorta: s/p 05/24/19: CTA chest: aortic root 4.7cm, ascending aorta 4.2cm, moderate LA calcifications. \par             - not tolerant of low-dose beta blocker secondary to bradycardia\par             - follow up with [facility] thoracic aortic center with Eyal Jenkins MD\par             - will follow with serial echocardiograms and periodic CTA (echocardiogram ordered today) \par \par 5. HIV disease:  \par             - continue Norvir Capsule, 100 MG, 1 capsule with a meal, Orally, Once a day\par             - continue Sustiva Tablet, 600 MG, Orally, once daily\par             - continue Zovirax Tablet, 800 MG, 1 tablet, Orally, once a day\par             - continue Descovy Tablet, 200-25 MG, Orally\par             - f/u with HIV specialist. \par \par 6. Marked sinus bradycardia: likely secondary to high resting vagal tone from CV fitness: + occasional symptoms\par             - avoid HR slowing agents\par             - s/p 07/29/19 to 08/05/19: Lifewatch MCT: an HR 54, 79% bradycardia, no pauses. \par \par 7. CKD II: Cr 1.16, GFR 66 (03/26/21)\par             - avoid nephrotoxic agents\par             - will send to nephrologist if Cr remains elevated\par \par 8. r/o SHLOMO\par              - will send for a carotid artery sonogram \par \par

## 2021-04-11 ENCOUNTER — NON-APPOINTMENT (OUTPATIENT)
Age: 66
End: 2021-04-11

## 2021-04-16 ENCOUNTER — APPOINTMENT (OUTPATIENT)
Dept: HEART AND VASCULAR | Facility: CLINIC | Age: 66
End: 2021-04-16
Payer: MEDICARE

## 2021-04-16 PROCEDURE — 93306 TTE W/DOPPLER COMPLETE: CPT

## 2021-04-16 PROCEDURE — 99072 ADDL SUPL MATRL&STAF TM PHE: CPT

## 2021-04-16 PROCEDURE — 93880 EXTRACRANIAL BILAT STUDY: CPT

## 2021-07-29 ENCOUNTER — TRANSCRIPTION ENCOUNTER (OUTPATIENT)
Age: 66
End: 2021-07-29

## 2021-07-30 ENCOUNTER — TRANSCRIPTION ENCOUNTER (OUTPATIENT)
Age: 66
End: 2021-07-30

## 2021-08-02 ENCOUNTER — RESULT REVIEW (OUTPATIENT)
Age: 66
End: 2021-08-02

## 2021-08-02 ENCOUNTER — TRANSCRIPTION ENCOUNTER (OUTPATIENT)
Age: 66
End: 2021-08-02

## 2021-08-02 ENCOUNTER — APPOINTMENT (OUTPATIENT)
Dept: CT IMAGING | Facility: CLINIC | Age: 66
End: 2021-08-02
Payer: MEDICARE

## 2021-08-02 ENCOUNTER — OUTPATIENT (OUTPATIENT)
Dept: OUTPATIENT SERVICES | Facility: HOSPITAL | Age: 66
LOS: 1 days | End: 2021-08-02

## 2021-08-02 PROCEDURE — 71275 CT ANGIOGRAPHY CHEST: CPT | Mod: 26

## 2021-08-04 ENCOUNTER — APPOINTMENT (OUTPATIENT)
Dept: CARDIOTHORACIC SURGERY | Facility: CLINIC | Age: 66
End: 2021-08-04
Payer: MEDICARE

## 2021-08-04 VITALS
RESPIRATION RATE: 17 BRPM | OXYGEN SATURATION: 99 % | DIASTOLIC BLOOD PRESSURE: 74 MMHG | TEMPERATURE: 97.5 F | WEIGHT: 166 LBS | SYSTOLIC BLOOD PRESSURE: 137 MMHG | HEIGHT: 73 IN | HEART RATE: 50 BPM | BODY MASS INDEX: 22 KG/M2

## 2021-08-04 PROCEDURE — 99213 OFFICE O/P EST LOW 20 MIN: CPT

## 2021-08-06 NOTE — END OF VISIT
[Time Spent: ___ minutes] : I have spent [unfilled] minutes of time on the encounter. [FreeTextEntry3] : \par I, BUBBA SALEEMU , am scribing for and in the presence of ABY ADORNO the following sections: History of present illness, past Medical/family/surgical/family/social history, review of systems, vital signs, physical exam and disposition.\par \par I personally performed the services described in the documentation, reviewed the documentation recorded by the scribe in my presence and it accurately and completely records my words and actions.\par \par

## 2021-08-06 NOTE — PROCEDURE
[FreeTextEntry1] : Dr. Jenkins reviewed the indications for surgery, and used our webpage [email or web address] <[email or web address]> to illustrate the aorta and anatomy of the heart. Those indications are the following: size greater than 5.0 cm, symptomatic aneurysms, family history of aortic dissection or aneurysm death with a size greater than 4.5 cm, other necessary cardiac procedures such as coronary artery bypass grafting or valvular disorders with an aneurysm greater than 4.5 cm, or connective tissue disorders with an aneurysm size greater than 4.5 cm. The patient does not meet size criteria for surgical intervention at the time.\par \par Dr. Jenkins discussed activity restrictions with the patient, and would advise exercise at a moderate amount with no heavy lifting over one third of body weight, and avoiding heart rates that exceed 140 beats per minute. In addition, every patient should abstain from tobacco abuse and to avoid all illicit drug use, especially stimulants such as cocaine or methamphetamine. Dr. Jenkins also counseled regarding maintaining a healthy heart diet, and losing any excessive weight as this also put undue stress on both the aorta and entire cardiovascular system. First degree family members should be screened for bicuspid valve disease, and ascending aortic aneurysms. \par \par Patient was advised to view the educational video prior to this visit regarding aortic pathology, risk factors, surgical procedures, and lifestyle modifications. Video can be retrieved at [email or web address].\par

## 2021-08-06 NOTE — ASSESSMENT
[FreeTextEntry1] : 65 year old male with a PMH of HIV (on treatment, last viral load undetectable), HTN, dyslipidemia, present today for a 1 year followup visit for evaluation and management of aortic regurgitation and a dilated ascending aorta with repeat diagnostic imaging. \par \par The patient's medical records and diagnostic images were reviewed at the time of this office visit, and the following recommendation was made. Review of the imaging shows aortic pathology has remained stable and does not require surgical intervention at this time.\par \par Plan:\par 1. Continue medication regimen\par 2. Follow up with PCP and cardiologist\par 3. BP control- I have recommended the patient to monitor his blood pressure closely. I have also advised the patient to take daily blood pressures at home and adhere to medication regimen.\par 4. Return to [facility] in one year with TTE and CTA chest. \par

## 2021-08-06 NOTE — PHYSICAL EXAM
[Sclera] : the sclera and conjunctiva were normal [Neck Appearance] : the appearance of the neck was normal [] : no respiratory distress [Apical Impulse] : the apical impulse was normal [Examination Of The Chest] : the chest was normal in appearance [2+] : left 2+ [Bowel Sounds] : normal bowel sounds [Abdomen Soft] : soft [Cervical Lymph Nodes Enlarged Posterior Bilaterally] : posterior cervical [No CVA Tenderness] : no ~M costovertebral angle tenderness [Abnormal Walk] : normal gait [Nail Clubbing] : no clubbing  or cyanosis of the fingernails [Musculoskeletal - Swelling] : no joint swelling seen [Motor Tone] : muscle strength and tone were normal [Skin Color & Pigmentation] : normal skin color and pigmentation [Skin Turgor] : normal skin turgor [Deep Tendon Reflexes (DTR)] : deep tendon reflexes were 2+ and symmetric [Oriented To Time, Place, And Person] : oriented to person, place, and time [General Appearance - Alert] : alert [General Appearance - In No Acute Distress] : in no acute distress [Outer Ear] : the ears and nose were normal in appearance [FreeTextEntry1] : deferred

## 2021-08-06 NOTE — DATA REVIEWED
[FreeTextEntry1] : \par CTA chest 5/24/19: aortic root 4.7cm, ascending aorta 4.2cm, moderate LA calcifications. \par \par exercise stress echo 08/08/19: EF 62%, dilated aortic root 4.3cm, dilated prox ascending aorta 4.6cm, mild AI, normal wall motion, PA pressures, and ECG post 13.9 METS. \par \par Echo 6/1/20: EF normal, moderate AR (mildly increased since 2018), aortic root 4.2cm. \par \par CTA chest 8/4/20:\par 1.  Since May 24, 2019, unchanged thoracic aortic measurements as above. Unchanged dilatation aortic root, up to 4.7 cm.\par 2.  Moderate multivessel coronary artery calcific atherosclerosis.\par

## 2021-08-06 NOTE — HISTORY OF PRESENT ILLNESS
[FreeTextEntry1] : 65 year old male with a PMH of HIV (on treatment, last viral load undetectable), HTN, dyslipidemia, present today for a 1 year followup visit for evaluation and management of aortic regurgitation and a dilated ascending aorta with repeat diagnostic imaging. \par \par Gated CTA chest 8/2/21: \par Aortic measurements as follows:\par *  Aortic root (sinuses of Valsalva): 4.9 x 4.7 cm (coronal by sagittal planes), previously 4.7 x 4.5 cm\par *  Mid ascending aorta: 4.4 x 4.4 cm, previously up to 4.2 cm\par *  Aortic arch: 3.1 cm sagittal plane\par *  Descending thoracic aorta (proximal): 2.4 cm sagittal plane\par *  Suprarenal aorta: 2.4 x 2.4 cm\par My measurement of the aortic root is 4.6-4.7cm; stable and no change from previous CT. \par \par TTE 4/16/21:\par structurally normal trileaflet aortic valve with normal opening. there is moderate to severe aortic regurgitation. EF 66%. LVIDd 4.82cm. \par \par Patient is doing well and denies recent hospitalization, ER visits, or surgeries. He  denies fever, chills, fatigue, headache, blurred vision, dizziness, syncope, chest pain, palpitations, shortness of breath, orthopnea, paroxysmal nocturnal dyspnea, nausea, vomiting, abdominal pain, back pain, BRBPR or swelling to legs.\par

## 2021-10-08 DIAGNOSIS — Z86.39 PERSONAL HISTORY OF OTHER ENDOCRINE, NUTRITIONAL AND METABOLIC DISEASE: ICD-10-CM

## 2021-10-08 DIAGNOSIS — Z86.79 PERSONAL HISTORY OF OTHER DISEASES OF THE CIRCULATORY SYSTEM: ICD-10-CM

## 2021-10-11 ENCOUNTER — APPOINTMENT (OUTPATIENT)
Dept: HEART AND VASCULAR | Facility: CLINIC | Age: 66
End: 2021-10-11
Payer: MEDICARE

## 2021-10-11 ENCOUNTER — NON-APPOINTMENT (OUTPATIENT)
Age: 66
End: 2021-10-11

## 2021-10-11 VITALS
SYSTOLIC BLOOD PRESSURE: 113 MMHG | OXYGEN SATURATION: 98 % | DIASTOLIC BLOOD PRESSURE: 57 MMHG | HEIGHT: 73 IN | WEIGHT: 172 LBS | BODY MASS INDEX: 22.8 KG/M2 | HEART RATE: 61 BPM

## 2021-10-11 PROCEDURE — 99214 OFFICE O/P EST MOD 30 MIN: CPT | Mod: 25

## 2021-10-11 PROCEDURE — 93000 ELECTROCARDIOGRAM COMPLETE: CPT

## 2021-10-11 RX ORDER — DOLUTEGRAVIR SODIUM AND RILPIVIRINE HYDROCHLORIDE 50; 25 MG/1; MG/1
50-25 TABLET, FILM COATED ORAL DAILY
Refills: 0 | Status: ACTIVE | COMMUNITY
Start: 2021-09-08

## 2021-10-11 NOTE — HISTORY OF PRESENT ILLNESS
[FreeTextEntry1] : Mr. Lipscomb presents for follow up and management of HIV, HTN, dyslipidemia, CKD III, aortic regurgitation, and a dilated ascending aorta (4.7cm). His primary care provider, Arsenio Street NP, noted a murmur on cardiac auscultation. He was subsequently referred for an echocardiogram at [facility] on 03/22/17 which revealed an EF of 60%, a mildly dilated left atrium, mild-to-moderate aortic regurgitation, and a dilated ascending aorta measuring 4.3cm. He is very physically fit and exercises daily. He has resting bradycardia as a result of his cardiovascular fitness level. We had an extensive discussion about the results of the echocardiogram and the clinical importance of the dilated ascending aorta. I referred him to a cardiothoracic surgeon, Bc Banegas MD, who specialized in thoracic aortic surgery.  He had a CT aortogram on 08/02/21 which revealed aortic root 4.9 cm, ascending aorta 4.4 cm, and moderate multivessel coronary calcifications. He was not able to tolerate low-dose Toprol XL secondary to an episode of weakness and near syncope.  He had an echocardiogram on 06/01/20 which revealed an EF of 66%, dilated aortic root 4.2cm, and moderate AI.  He had complaints of mid epigastric pain for several hours at a time over the course of 2-3 days several weeks ago and he would like to rule out the possibility of cholelithiasis.

## 2021-10-11 NOTE — REASON FOR VISIT
[Other: ____] : [unfilled] [FreeTextEntry1] : Diagnostic Tests:\par ------------------------------------\par ECG:\par 10/11/21: sinus rhythm, frequent APCs. \par 04/06/21: sinus bradycardia at 58 bpm, single PVC. \par 01/21/20: marked sinus bradycardia at 49bpm, otherwise normal. \par 07/22/19: marked sinus bradycardia at 46bpm, otherwise normal.  \par 12/10/18: sinus favian at 58bpm, 2 PVCs. \par 04/18/18: sinus bradycardia, otherwise normal. \par 04/11/17: sinus bradycardia, otherwise normal. \par -------------------------------------\par Echo:\par 04/16/21: EF 66%, aortic root 4.2 cm, mod-severe AI\par 06/01/20: EF 66%, dilated aortic root 4.2cm, moderate AI.  \par 05/01/18: EF 65%, dilated LA, aortic root 4.2cm, mild-moderate AI. \par 03/22/17: EF 60%, mild-moderate AI, mildly dilated LA, dilated ascending aorta 4.3cm.\par -------------------------------------\par CT:\par 08/02/21: CTA chest: aortic root 4.9 cm, ascending aorta 4.4 cm, moderate multivessel coronary calcifications.\par 08/04/20: CTA chest: aortic root 4.7cm, ascending aorta 4.2cm, moderate LA calcifications.\par 05/24/19: CTA chest: aortic root 4.7cm, ascending aorta 4.2cm, moderate LA calcifications. \par 12/04/17: CTA aorta: dilated aortic root and ascending aorta, 4.7cm at sinuses of Valsalva, 4.3 sino-tubular junction, 4.3 at RPA. \par 04/17/17: CTA aorta: dilated aortic root and ascending aorta, 4.7cm at sinuses of Valsalva.\par -------------------------------------\par Stress:\par 08/08/19: exercise stress echo: EF 62%, dilated aortic root 4.3cm, dilated prox ascending aorta 4.6cm, mild AI, normal wall motion, PA pressures, and ECG post 13.9 METS. \par 12/13/17: exercise stress echo: EF 62%, 14.8 METS, normal wall motion and PA pressures, aortic root 4.2cm, mild AI.\par -------------------------------------\par Vascular:\par 04/16/21: Carotid: 1-19% stenosis b/l\par -------------------------------------\par Monitor:\par 07/29/19 to 08/05/19:  Lifewatch MCT: an HR 54, 79% bradycardia, no pauses.

## 2021-10-11 NOTE — ASSESSMENT
[FreeTextEntry1] : 1. HTN: BP at ACC/AHA 2017 guideline target, not tolerant of low-dose beta blocker secondary to bradycardia\par             - continue telmisartan/hct 80/25mg po daily\par \par 2. Hyperlipemia: LDL at goal,  (10/5/21): off statins\par             - continue off statins at this time given new ART regimen\par \par 3. Aortic regurgitation: s/p echo: 04/16/21: EF 66%, aortic root 4.2 cm, mod-severe AI\par             - follow up [facility] thoracic aortic center with Eyal Jenkins MD (08/2020)\par             - will follow with serial echocardiograms\par \par 4. Dilated aortic root: dilated ascending aorta: s/p 04/16/21: EF 66%, aortic root 4.2 cm, mod-severe AI\par             - not tolerant of low-dose beta blocker secondary to bradycardia\par             - follow up with [facility] thoracic aortic center with Eyal Jenkins MD\par             - will follow with serial echocardiograms and periodic CTA \par \par 5. HIV disease:  \par             - continue Juluca 50/25mg po qd \par             - f/u with HIV specialist\par \par 6. Marked sinus bradycardia: likely secondary to high resting vagal tone from CV fitness: + occasional symptoms\par             - avoid HR slowing agents\par             - s/p 07/29/19 to 08/05/19: Lifewatch MCT: an HR 54, 79% bradycardia, no pauses. \par \par 7. CKD II: Cr 1.12 (10/05/21)\par             - avoid nephrotoxic agents\par             - will consider referral to nephrology if CKD worsens\par \par 8. CAD: coronary artery calcifications noted on CT aortogram: \par            - he would like to continue off statins at this time\par            - will send for a coronary artery angiogram when time for aorta/aortic valve intervention\par   \par 9. Abdominal pain:  1 episode: CT chest with upper abdominal cuts was normal (08/2021)\par             - will send for an abdominal sonogram \par \par An ECG was obtained to evaluate heart rhythm and screen for any underlying cardiac abnormalities. \par

## 2021-10-14 ENCOUNTER — OUTPATIENT (OUTPATIENT)
Dept: OUTPATIENT SERVICES | Facility: HOSPITAL | Age: 66
LOS: 1 days | End: 2021-10-14

## 2021-10-14 ENCOUNTER — APPOINTMENT (OUTPATIENT)
Dept: ULTRASOUND IMAGING | Facility: CLINIC | Age: 66
End: 2021-10-14
Payer: MEDICARE

## 2021-10-14 PROCEDURE — 76700 US EXAM ABDOM COMPLETE: CPT | Mod: 26

## 2022-04-11 ENCOUNTER — NON-APPOINTMENT (OUTPATIENT)
Age: 67
End: 2022-04-11

## 2022-04-11 ENCOUNTER — APPOINTMENT (OUTPATIENT)
Dept: HEART AND VASCULAR | Facility: CLINIC | Age: 67
End: 2022-04-11
Payer: MEDICARE

## 2022-04-11 VITALS
OXYGEN SATURATION: 99 % | DIASTOLIC BLOOD PRESSURE: 64 MMHG | TEMPERATURE: 98.7 F | SYSTOLIC BLOOD PRESSURE: 138 MMHG | HEART RATE: 63 BPM | HEIGHT: 73 IN | BODY MASS INDEX: 22.8 KG/M2 | WEIGHT: 172 LBS

## 2022-04-11 DIAGNOSIS — B20 HUMAN IMMUNODEFICIENCY VIRUS [HIV] DISEASE: ICD-10-CM

## 2022-04-11 DIAGNOSIS — Z87.898 PERSONAL HISTORY OF OTHER SPECIFIED CONDITIONS: ICD-10-CM

## 2022-04-11 PROCEDURE — 93000 ELECTROCARDIOGRAM COMPLETE: CPT

## 2022-04-11 PROCEDURE — 99214 OFFICE O/P EST MOD 30 MIN: CPT | Mod: 25

## 2022-04-11 NOTE — ASSESSMENT
[FreeTextEntry1] : 1. HTN: BP at ACC/AHA 2017 guideline target, not tolerant of low-dose beta blocker secondary to bradycardia\par             - continue telmisartan/hct 80/25mg po daily\par \par 2. Hyperlipemia: LDL at goal,  (04/05/22): off statins\par             - continue off statins at this time given new ART regimen\par             - patient will provide copy of recent lab work from PMD's office for my review \par \par 3. Aortic regurgitation: s/p echo: 04/16/21: EF 66%, aortic root 4.2 cm, mod-severe AI\par             - follow up [facility] thoracic aortic center with Eyal Jenkins MD (08/2020)\par             - will follow with serial echocardiograms (ordered today) \par \par 4. Dilated aortic root: dilated ascending aorta: s/p 04/16/21: EF 66%, aortic root 4.2 cm, mod-severe AI\par             - not tolerant of low-dose beta blocker secondary to bradycardia\par             - follow up with [facility] thoracic aortic center with Eyal Jenkins MD\par             - will follow with serial echocardiograms and periodic CTA \par \par 5. HIV disease:  \par             - continue Juluca 50/25mg po qd \par             - f/u with HIV specialist\par \par 6. Marked sinus bradycardia: likely secondary to high resting vagal tone from CV fitness: + occasional symptoms\par             - avoid HR slowing agents\par             - s/p 07/29/19 to 08/05/19: Lifewatch MCT: an HR 54, 79% bradycardia, no pauses. \par \par 7. CKD II: Cr 1.33, GFR 56 (04/05/22)\par             - avoid nephrotoxic agents\par             - will consider referral to nephrology if CKD worsens\par \par 8. CAD: coronary artery calcifications noted on CT aortogram: \par            - he would like to continue off statins at this time\par            - will send for a coronary artery angiogram when time for aorta/aortic valve intervention\par   \par 9. Abdominal pain:  1 episode: CT chest with upper abdominal cuts was normal (08/2021)\par             - will send for an abdominal sonogram \par \par \par An ECG was obtained to evaluate heart rhythm and screen for any underlying cardiac abnormalities. \par

## 2022-04-11 NOTE — HISTORY OF PRESENT ILLNESS
[FreeTextEntry1] : Mr. Lipscomb presents for follow up and management of HIV, HTN, dyslipidemia, CKD III, aortic regurgitation, and a dilated ascending aorta (4.7cm). His primary care provider, Arsenio Street NP, noted a murmur on cardiac auscultation. He was subsequently referred for an echocardiogram at [facility] on 03/22/17 which revealed an EF of 60%, a mildly dilated left atrium, mild-to-moderate aortic regurgitation, and a dilated ascending aorta measuring 4.3cm. He is very physically fit and exercises daily. He has resting bradycardia as a result of his cardiovascular fitness level. We had an extensive discussion about the results of the echocardiogram and the clinical importance of the dilated ascending aorta. I referred him to a cardiothoracic surgeon, Bc Banegas MD, who specialized in thoracic aortic surgery.  He had a CT aortogram on 08/02/21 which revealed aortic root 4.9 cm, ascending aorta 4.4 cm, and moderate multivessel coronary calcifications. He was not able to tolerate low-dose Toprol XL secondary to an episode of weakness and near syncope.  He had an echocardiogram on 04/16/21 which revealed an EF of 66%, aortic root 4.2 cm, and mod-severe AI.  His lab work from 04/05/22 revealed a Cr 1.33 and a GFR of 56.  We discussed the importance of following up his renal function and seeing a nephrologist if his repeat lab work reveals a persistently elevated creatinine. \par \par *******\par He prefers 6 month f/u

## 2022-04-11 NOTE — REASON FOR VISIT
[Other: ____] : [unfilled] [FreeTextEntry1] : Diagnostic Tests:\par ------------------------------------\par ECG:\par 04/11/22: sinus rhythm, frequent APCs. \par 10/11/21: sinus rhythm, frequent APCs. \par 04/06/21: sinus bradycardia at 58 bpm, single PVC. \par 01/21/20: marked sinus bradycardia at 49bpm, otherwise normal. \par 07/22/19: marked sinus bradycardia at 46bpm, otherwise normal.  \par 12/10/18: sinus favian at 58bpm, 2 PVCs. \par 04/18/18: sinus bradycardia, otherwise normal. \par 04/11/17: sinus bradycardia, otherwise normal. \par -------------------------------------\par Echo:\par 04/16/21: EF 66%, aortic root 4.2 cm, mod-severe AI\par 06/01/20: EF 66%, dilated aortic root 4.2cm, moderate AI.  \par 05/01/18: EF 65%, dilated LA, aortic root 4.2cm, mild-moderate AI. \par 03/22/17: EF 60%, mild-moderate AI, mildly dilated LA, dilated ascending aorta 4.3cm.\par -------------------------------------\par CT:\par 08/02/21: CTA chest: aortic root 4.9 cm, ascending aorta 4.4 cm, moderate multivessel coronary calcifications.\par 08/04/20: CTA chest: aortic root 4.7cm, ascending aorta 4.2cm, moderate LA calcifications.\par 05/24/19: CTA chest: aortic root 4.7cm, ascending aorta 4.2cm, moderate LA calcifications. \par 12/04/17: CTA aorta: dilated aortic root and ascending aorta, 4.7cm at sinuses of Valsalva, 4.3 sino-tubular junction, 4.3 at RPA. \par 04/17/17: CTA aorta: dilated aortic root and ascending aorta, 4.7cm at sinuses of Valsalva.\par -------------------------------------\par Stress:\par 08/08/19: exercise stress echo: EF 62%, dilated aortic root 4.3cm, dilated prox ascending aorta 4.6cm, mild AI, normal wall motion, PA pressures, and ECG post 13.9 METS. \par 12/13/17: exercise stress echo: EF 62%, 14.8 METS, normal wall motion and PA pressures, aortic root 4.2cm, mild AI.\par -------------------------------------\par Vascular:\par 04/16/21: Carotid: 1-19% stenosis b/l\par -------------------------------------\par Monitor:\par 07/29/19 to 08/05/19:  Lifewatch MCT: an HR 54, 79% bradycardia, no pauses.

## 2022-05-05 ENCOUNTER — NON-APPOINTMENT (OUTPATIENT)
Age: 67
End: 2022-05-05

## 2022-05-05 ENCOUNTER — APPOINTMENT (OUTPATIENT)
Dept: HEART AND VASCULAR | Facility: CLINIC | Age: 67
End: 2022-05-05
Payer: MEDICARE

## 2022-05-05 PROCEDURE — 93306 TTE W/DOPPLER COMPLETE: CPT

## 2022-07-28 ENCOUNTER — APPOINTMENT (OUTPATIENT)
Dept: CT IMAGING | Facility: CLINIC | Age: 67
End: 2022-07-28

## 2022-07-28 ENCOUNTER — OUTPATIENT (OUTPATIENT)
Dept: OUTPATIENT SERVICES | Facility: HOSPITAL | Age: 67
LOS: 1 days | End: 2022-07-28

## 2022-07-28 PROCEDURE — 71275 CT ANGIOGRAPHY CHEST: CPT | Mod: 26

## 2022-08-03 ENCOUNTER — APPOINTMENT (OUTPATIENT)
Dept: CARDIOTHORACIC SURGERY | Facility: CLINIC | Age: 67
End: 2022-08-03

## 2022-08-03 VITALS
OXYGEN SATURATION: 96 % | TEMPERATURE: 97.2 F | WEIGHT: 170 LBS | SYSTOLIC BLOOD PRESSURE: 121 MMHG | DIASTOLIC BLOOD PRESSURE: 63 MMHG | BODY MASS INDEX: 22.53 KG/M2 | RESPIRATION RATE: 17 BRPM | HEIGHT: 73 IN | HEART RATE: 56 BPM

## 2022-08-03 PROCEDURE — 99214 OFFICE O/P EST MOD 30 MIN: CPT

## 2022-08-08 NOTE — DATA REVIEWED
[FreeTextEntry1] : \par CTA chest 5/24/19: aortic root 4.7cm, ascending aorta 4.2cm, moderate LA calcifications. \par \par exercise stress echo 08/08/19: EF 62%, dilated aortic root 4.3cm, dilated prox ascending aorta 4.6cm, mild AI, normal wall motion, PA pressures, and ECG post 13.9 METS. \par \par Echo 6/1/20: EF normal, moderate AR (mildly increased since 2018), aortic root 4.2cm. \par \par CTA chest 8/4/20:\par 1.  Since May 24, 2019, unchanged thoracic aortic measurements as above. Unchanged dilatation aortic root, up to 4.7 cm.\par 2.  Moderate multivessel coronary artery calcific atherosclerosis.\par \par Gated CTA chest 8/2/21: \par Aortic measurements as follows:\par *  Aortic root (sinuses of Valsalva): 4.9 x 4.7 cm (coronal by sagittal planes), previously 4.7 x 4.5 cm\par *  Mid ascending aorta: 4.4 x 4.4 cm, previously up to 4.2 cm\par *  Aortic arch: 3.1 cm sagittal plane\par *  Descending thoracic aorta (proximal): 2.4 cm sagittal plane\par *  Suprarenal aorta: 2.4 x 2.4 cm\par My measurement of the aortic root is 4.6-4.7cm; stable and no change from previous CT. \par \par TTE 4/16/21:\par structurally normal trileaflet aortic valve with normal opening. there is moderate to severe aortic regurgitation. EF 66%. LVIDd 4.82cm. \par \par Echo 5/5/22: \par 1. Normal left and right ventricular size and systolic function \par 2. Normal left ventricular diastolic function and filing pressure \par 3. Dilated aortic root. 4.5 cm, 2.2 cm/m2 \par 4. Dilated proximal ascending aorta 4.4 cm, 2.1 cm/m2 \par 5. Moderate aortic regurgitation \par \par \par Cta chest 7/28/22\par Aortic measurements as follows:\par *  Aortic root (sinuses of Valsalva): 4.9 x 4.7 cm, unchanged\par *  Mid ascending aorta: 4.5 x 4.5 cm, previously 4.4 x 4.4 cm August 2, 2021, measured 4.2 cm on August 4, 2020\par *  Aortic arch: 3.1 cm, unchanged\par *  Descending thoracic aorta (proximal): 2.4 cm, unchanged\par *  Suprarenal aorta: 2.4 x 2.4 cm, unchanged\par No aortic dissection or aortic atherosclerosis. Moderate multivessel coronary artery calcific atherosclerosis. No pulmonary embolism.\par \par Since August 2, 2021, minimally increased fusiform dilatation mid ascending thoracic aorta. Unchanged dilated aortic root.\par \par

## 2022-08-08 NOTE — END OF VISIT
[Time Spent: ___ minutes] : I have spent [unfilled] minutes of time on the encounter. [FreeTextEntry3] : I, SIXTO PEREZ , am scribing for and in the presence of ABY ADORNO the following sections: History of present illness, past Medical/family/surgical/family/social history, review of systems, vital signs, physical exam and disposition.\par \par I personally performed the services described in the documentation, reviewed the documentation recorded by the scribe in my presence and it accurately and completely records my words and actions.\par \par

## 2022-08-08 NOTE — PROCEDURE
[FreeTextEntry1] : Dr. Jenkins reviewed the indications for surgery, and used our webpage [email or web address] <[email or web address]> to illustrate the aorta and anatomy of the heart. Those indications are the following: size greater than 5.0 cm, symptomatic aneurysms, family history of aortic dissection or aneurysm death with a size greater than 4.5 cm, other necessary cardiac procedures such as coronary artery bypass grafting or valvular disorders with an aneurysm greater than 4.5 cm, or connective tissue disorders with an aneurysm size greater than 4.5 cm. The patient does not meet size criteria for surgical intervention at the time.\par \par Dr. Jenkins discussed activity restrictions with the patient, and would advise exercise at a moderate amount with no heavy lifting over one third of body weight, and avoiding heart rates that exceed 140 beats per minute. In addition, every patient should abstain from tobacco abuse and to avoid all illicit drug use, especially stimulants such as cocaine or methamphetamine. Dr. Jenkins also counseled regarding maintaining a healthy heart diet, and losing any excessive weight as this also put undue stress on both the aorta and entire cardiovascular system. First degree family members should be screened for bicuspid valve disease, and ascending aortic aneurysms. \par \par Patient was advised to view the educational video prior to this visit regarding aortic pathology, risk factors, surgical procedures, and lifestyle modifications. Video can be retrieved at [email or web address].\par

## 2022-08-08 NOTE — HISTORY OF PRESENT ILLNESS
[FreeTextEntry1] : 66 year old male with a PMH of HIV (on treatment, last viral load undetectable), HTN, dyslipidemia, present today for a 1 year followup visit for evaluation and management of aortic regurgitation and a dilated ascending aorta with repeat diagnostic imaging. \par \par \par \par \par

## 2022-08-08 NOTE — ASSESSMENT
[FreeTextEntry1] : 66 year old male with a PMH of HIV (on treatment, last viral load undetectable), HTN, dyslipidemia, present today for a 1 year followup visit for evaluation and management of aortic regurgitation and a dilated ascending aorta with repeat diagnostic imaging. \par \par The patient's medical records and diagnostic images were reviewed at the time of this office visit, and the following recommendation was made. Review of the imaging shows aortic pathology has remained stable and does not require surgical intervention at this time.\par \par Plan:\par 1. Continue medication regimen\par 2. Follow up with PCP and cardiologist\par 3. BP control- I have recommended the patient to monitor his blood pressure closely. I have also advised the patient to take daily blood pressures at home and adhere to medication regimen.\par 4. [facility] with TTE and CTA chest. \par

## 2022-08-08 NOTE — PHYSICAL EXAM
[Sclera] : the sclera and conjunctiva were normal [PERRL With Normal Accommodation] : pupils were equal in size, round, and reactive to light [Extraocular Movements] : extraocular movements were intact [Neck Appearance] : the appearance of the neck was normal [Neck Cervical Mass (___cm)] : no neck mass was observed [Jugular Venous Distention Increased] : there was no jugular-venous distention [Thyroid Diffuse Enlargement] : the thyroid was not enlarged [Thyroid Nodule] : there were no palpable thyroid nodules [Auscultation Breath Sounds / Voice Sounds] : lungs were clear to auscultation bilaterally [Heart Rate And Rhythm] : heart rate was normal and rhythm regular [Heart Sounds] : normal S1 and S2 [Heart Sounds Gallop] : no gallops [Murmurs] : no murmurs [Heart Sounds Pericardial Friction Rub] : no pericardial rub [Examination Of The Chest] : the chest was normal in appearance [Chest Visual Inspection Thoracic Asymmetry] : no chest asymmetry [Diminished Respiratory Excursion] : normal chest expansion [2+] : left 2+ [No Abnormalities] : the abdominal aorta was not enlarged and no bruit was heard [Breast Appearance] : normal in appearance [Bowel Sounds] : normal bowel sounds [Abdomen Tenderness] : non-tender [Abdomen Soft] : soft [No CVA Tenderness] : no ~M costovertebral angle tenderness [Abnormal Walk] : normal gait [Nail Clubbing] : no clubbing  or cyanosis of the fingernails [Musculoskeletal - Swelling] : no joint swelling seen [Skin Color & Pigmentation] : normal skin color and pigmentation [Skin Turgor] : normal skin turgor [] : no rash [Cranial Nerves] : cranial nerves 2-12 were intact [Deep Tendon Reflexes (DTR)] : deep tendon reflexes were 2+ and symmetric [Sensation] : the sensory exam was normal to light touch and pinprick [Oriented To Time, Place, And Person] : oriented to person, place, and time [Impaired Insight] : insight and judgment were intact [Affect] : the affect was normal [Right Carotid Bruit] : no bruit heard over the right carotid [Left Carotid Bruit] : no bruit heard over the left carotid [Right Femoral Bruit] : no bruit heard over the right femoral artery [Left Femoral Bruit] : no bruit heard over the left femoral artery [Fingers] :  capillary refill of the fingers was normal [Toes] :  capillary refill of the toes was normal [Varicose Veins Of The Right Leg] : the patient has no varicose veins of the right leg [Varicose Veins Of The Left Leg] : the patient has no varicose veins of the left leg

## 2022-10-11 ENCOUNTER — NON-APPOINTMENT (OUTPATIENT)
Age: 67
End: 2022-10-11

## 2022-10-11 ENCOUNTER — APPOINTMENT (OUTPATIENT)
Dept: HEART AND VASCULAR | Facility: CLINIC | Age: 67
End: 2022-10-11

## 2022-10-11 VITALS — DIASTOLIC BLOOD PRESSURE: 70 MMHG | SYSTOLIC BLOOD PRESSURE: 126 MMHG

## 2022-10-11 VITALS
TEMPERATURE: 96.2 F | DIASTOLIC BLOOD PRESSURE: 62 MMHG | SYSTOLIC BLOOD PRESSURE: 131 MMHG | HEIGHT: 73 IN | OXYGEN SATURATION: 99 % | BODY MASS INDEX: 22.66 KG/M2 | HEART RATE: 51 BPM | WEIGHT: 171 LBS

## 2022-10-11 PROCEDURE — 99214 OFFICE O/P EST MOD 30 MIN: CPT | Mod: 25

## 2022-10-11 PROCEDURE — 93000 ELECTROCARDIOGRAM COMPLETE: CPT

## 2022-10-11 NOTE — HISTORY OF PRESENT ILLNESS
[FreeTextEntry1] : Mr. Lipscomb presents for follow up and management of HIV, HTN, dyslipidemia, CKD III, aortic regurgitation, and a dilated ascending aorta (4.7cm). His primary care provider, Arsenio Street NP, noted a murmur on cardiac auscultation. He was subsequently referred for an echocardiogram at [facility] on 03/22/17 which revealed an EF of 60%, a mildly dilated left atrium, mild-to-moderate aortic regurgitation, and a dilated ascending aorta measuring 4.3cm. He is very physically fit and exercises daily. He has resting bradycardia as a result of his cardiovascular fitness level. We had an extensive discussion about the results of the echocardiogram and the clinical importance of the dilated ascending aorta. I referred him to a cardiothoracic surgeon, Bc Banegas MD, who specialized in thoracic aortic surgery.  He had a CT aortogram on 08/02/21 which revealed aortic root 4.9 cm, ascending aorta 4.4 cm, and moderate multivessel coronary calcifications. He was not able to tolerate low-dose Toprol XL secondary to an episode of weakness and near syncope.  On 05/05/22, he had an echocardiogram which revealed an EF of 71%, dilated aortic root (4.5cm, 2.2 cm/m2), dilated proximal ascending aorta (4.4 cm, 2.1 cm/m2), and moderate AI.  At present, he has complaints of diffuse body pruritus.  His lab work from 08/08/22 reveals a creatinine of 1.42 and an eGFR of 55.  \par \par *******\par He prefers 6 month f/u

## 2022-10-11 NOTE — ASSESSMENT
[FreeTextEntry1] : 1. HTN: BP at ACC/AHA 2017 guideline target, not tolerant of low-dose beta blocker secondary to bradycardia\par             - continue telmisartan/hct 80/25mg po daily\par \par 2. Hyperlipemia: LDL at goal,  (08/08/22): off statins\par             - continue off statins at this time given new ART regimen\par \par 3. Aortic regurgitation: s/p echo: 05/05/22: EF 71%, dilated aortic root (4.5cm, 2.2 cm/m2), dilated proximal ascending aorta (4.4 cm, 2.1 cm/m2), moderate AI: \par             - follow up [facility] thoracic aortic center with Eyal Jenkins MD (08/2020)\par             - will follow with serial echocardiograms \par \par 4. Dilated aortic root: dilated ascending aorta: s/p CTA chest 07/28/22 with aortic root 4.9 cm: \par             - not tolerant of low-dose beta blocker secondary to bradycardia\par             - follow up with [facility] thoracic aortic center with Eyal Jenkins MD\par             - will follow with serial echocardiograms and periodic CTA \par \par 5. HIV disease:  \par             - continue Juluca 50/25mg po qd \par             - f/u with HIV specialist\par \par 6. Marked sinus bradycardia: likely secondary to high resting vagal tone from CV fitness: + occasional symptoms\par             - avoid HR slowing agents\par             - s/p 07/29/19 to 08/05/19: Lifewatch MCT: an HR 54, 79% bradycardia, no pauses. \par \par 7. CKD III: Cr 1.42, eGFR 55 (08/08/22): \par             - avoid nephrotoxic agents\par             - will send to a nephrologist, Sae Morataya MD \par \par 8. CAD: coronary artery calcifications noted on CT aortogram: \par            - he would like to continue off statins at this time\par            - will send for a coronary artery angiogram when time for aorta/aortic valve intervention\par  \par

## 2022-10-11 NOTE — REASON FOR VISIT
[FreeTextEntry1] : Diagnostic Tests:\par ------------------------------------\par ECG:\par 10/11/22: sinus bradycardia at 54 bpm, frequent PVCs. \par 04/11/22: sinus rhythm, frequent APCs. \par 10/11/21: sinus rhythm, frequent APCs. \par 04/06/21: sinus bradycardia at 58 bpm, single PVC. \par 01/21/20: marked sinus bradycardia at 49bpm, otherwise normal. \par 07/22/19: marked sinus bradycardia at 46bpm, otherwise normal.  \par 12/10/18: sinus favian at 58bpm, 2 PVCs. \par 04/18/18: sinus bradycardia, otherwise normal. \par 04/11/17: sinus bradycardia, otherwise normal. \par -------------------------------------\par Echo:\par 05/05/22: EF 71%, dilated aortic root (4.5cm, 2.2 cm/m2), dilated proximal ascending aorta (4.4 cm, 2.1 cm/m2), moderate AI. \par 04/16/21: EF 66%, aortic root 4.2 cm, mod-severe AI\par 06/01/20: EF 66%, dilated aortic root 4.2cm, moderate AI.  \par 05/01/18: EF 65%, dilated LA, aortic root 4.2cm, mild-moderate AI. \par 03/22/17: EF 60%, mild-moderate AI, mildly dilated LA, dilated ascending aorta 4.3cm.\par -------------------------------------\par CT:\par 07/28/22: CTA chest: aortic root 4.9 cm, ascending aorta 4.5cm. \par 08/02/21: CTA chest: aortic root 4.9 cm, ascending aorta 4.4 cm, moderate multivessel coronary calcifications.\par 08/04/20: CTA chest: aortic root 4.7cm, ascending aorta 4.2cm, moderate LA calcifications.\par 05/24/19: CTA chest: aortic root 4.7cm, ascending aorta 4.2cm, moderate LA calcifications. \par 12/04/17: CTA aorta: dilated aortic root and ascending aorta, 4.7cm at sinuses of Valsalva, 4.3 sino-tubular junction, 4.3 at RPA. \par 04/17/17: CTA aorta: dilated aortic root and ascending aorta, 4.7cm at sinuses of Valsalva.\par -------------------------------------\par Stress:\par 08/08/19: exercise stress echo: EF 62%, dilated aortic root 4.3cm, dilated prox ascending aorta 4.6cm, mild AI, normal wall motion, PA pressures, and ECG post 13.9 METS. \par 12/13/17: exercise stress echo: EF 62%, 14.8 METS, normal wall motion and PA pressures, aortic root 4.2cm, mild AI.\par -------------------------------------\par Vascular:\par 04/16/21: Carotid: 1-19% stenosis b/l\par -------------------------------------\par Monitor:\par 07/29/19 to 08/05/19:  Lifewatch MCT: an HR 54, 79% bradycardia, no pauses.

## 2023-02-01 ENCOUNTER — NON-APPOINTMENT (OUTPATIENT)
Age: 68
End: 2023-02-01

## 2023-04-11 ENCOUNTER — NON-APPOINTMENT (OUTPATIENT)
Age: 68
End: 2023-04-11

## 2023-04-11 ENCOUNTER — APPOINTMENT (OUTPATIENT)
Dept: HEART AND VASCULAR | Facility: CLINIC | Age: 68
End: 2023-04-11
Payer: MEDICARE

## 2023-04-11 ENCOUNTER — APPOINTMENT (OUTPATIENT)
Dept: HEART AND VASCULAR | Facility: CLINIC | Age: 68
End: 2023-04-11

## 2023-04-11 VITALS
BODY MASS INDEX: 22.99 KG/M2 | HEIGHT: 73 IN | WEIGHT: 173.44 LBS | SYSTOLIC BLOOD PRESSURE: 135 MMHG | DIASTOLIC BLOOD PRESSURE: 97 MMHG | TEMPERATURE: 98.4 F | OXYGEN SATURATION: 98 % | HEART RATE: 45 BPM

## 2023-04-11 VITALS — DIASTOLIC BLOOD PRESSURE: 66 MMHG | SYSTOLIC BLOOD PRESSURE: 129 MMHG

## 2023-04-11 PROCEDURE — 99214 OFFICE O/P EST MOD 30 MIN: CPT | Mod: 25

## 2023-04-11 PROCEDURE — 93000 ELECTROCARDIOGRAM COMPLETE: CPT

## 2023-04-11 NOTE — ASSESSMENT
[FreeTextEntry1] : 1. HTN: BP at ACC/AHA 2017 guideline target, not tolerant of low-dose beta blocker secondary to bradycardia: \par             - continue telmisartan/hct 80/25mg po daily\par \par 2. Hyperlipemia: LDL at goal,  (02/07/23): \par             - continue off statins at this time given new ART regimen\par \par 3. Aortic regurgitation: s/p echo: 05/05/22: EF 71%, dilated aortic root (4.5cm, 2.2 cm/m2), dilated proximal ascending aorta (4.4 cm, 2.1 cm/m2), moderate AI: \par             - follow up [facility] thoracic aortic center with Eyal Jenkins MD (08/2020)\par             - will follow with serial echocardiograms (ordered today) \par \par 4. Dilated aortic root: dilated ascending aorta: s/p CTA chest 07/28/22 with aortic root 4.9 cm: \par             - not tolerant of low-dose beta blocker secondary to bradycardia\par             - follow up with [facility] thoracic aortic center with Eyal Jenkins MD\par             - will follow with serial echocardiograms and periodic CTA (echocardiogram ordered today)\par \par 5. HIV disease:  \par             - continue Juluca 50/25mg po qd \par             - f/u with HIV specialist\par \par 6. Marked sinus bradycardia: likely secondary to high resting vagal tone from CV fitness: + occasional symptoms\par             - avoid HR slowing agents\par             - s/p 07/29/19 to 08/05/19: Lifewatch MCT: an HR 54, 79% bradycardia, no pauses. \par \par 7. CKD III: Cr 1.31, eGFR 60 (02/07/23): \par             - avoid nephrotoxic agents\par             - will send to a nephrologist, Sae Morataya MD \par \par 8. CAD: coronary artery calcifications noted on CT aortogram: \par            - he would like to continue off statins at this time\par            - will send for a coronary artery angiogram when time for aorta/aortic valve intervention\par  \par

## 2023-04-11 NOTE — REASON FOR VISIT
[Other: ____] : [unfilled] [FreeTextEntry1] : Diagnostic Tests:\par ------------------------------------\par ECG:\par 04/11/23: sinus rhythm, frequent APCs.  \par 10/11/22: sinus bradycardia at 54 bpm, frequent PVCs. \par 04/11/22: sinus rhythm, frequent APCs. \par 10/11/21: sinus rhythm, frequent APCs. \par 04/06/21: sinus bradycardia at 58 bpm, single PVC. \par 01/21/20: marked sinus bradycardia at 49bpm, otherwise normal. \par 07/22/19: marked sinus bradycardia at 46bpm, otherwise normal.  \par 12/10/18: sinus favian at 58bpm, 2 PVCs. \par 04/18/18: sinus bradycardia, otherwise normal. \par 04/11/17: sinus bradycardia, otherwise normal. \par -------------------------------------\par Echo:\par 05/05/22: EF 71%, dilated aortic root (4.5cm, 2.2 cm/m2), dilated proximal ascending aorta (4.4 cm, 2.1 cm/m2), moderate AI. \par 04/16/21: EF 66%, aortic root 4.2 cm, mod-severe AI\par 06/01/20: EF 66%, dilated aortic root 4.2cm, moderate AI.  \par 05/01/18: EF 65%, dilated LA, aortic root 4.2cm, mild-moderate AI. \par 03/22/17: EF 60%, mild-moderate AI, mildly dilated LA, dilated ascending aorta 4.3cm.\par -------------------------------------\par CT:\par 07/28/22: CTA chest: aortic root 4.9 cm, ascending aorta 4.5cm. \par 08/02/21: CTA chest: aortic root 4.9 cm, ascending aorta 4.4 cm, moderate multivessel coronary calcifications.\par 08/04/20: CTA chest: aortic root 4.7cm, ascending aorta 4.2cm, moderate LA calcifications.\par 05/24/19: CTA chest: aortic root 4.7cm, ascending aorta 4.2cm, moderate LA calcifications. \par 12/04/17: CTA aorta: dilated aortic root and ascending aorta, 4.7cm at sinuses of Valsalva, 4.3 sino-tubular junction, 4.3 at RPA. \par 04/17/17: CTA aorta: dilated aortic root and ascending aorta, 4.7cm at sinuses of Valsalva.\par -------------------------------------\par Stress:\par 08/08/19: exercise stress echo: EF 62%, dilated aortic root 4.3cm, dilated prox ascending aorta 4.6cm, mild AI, normal wall motion, PA pressures, and ECG post 13.9 METS. \par 12/13/17: exercise stress echo: EF 62%, 14.8 METS, normal wall motion and PA pressures, aortic root 4.2cm, mild AI.\par -------------------------------------\par Vascular:\par 04/16/21: Carotid: 1-19% stenosis b/l\par -------------------------------------\par Monitor:\par 07/29/19 to 08/05/19:  Lifewatch MCT: an HR 54, 79% bradycardia, no pauses.

## 2023-04-11 NOTE — HISTORY OF PRESENT ILLNESS
[FreeTextEntry1] : Mr. Lipscomb presents for follow up and management of HIV, HTN, dyslipidemia, CKD III, aortic regurgitation, and a dilated ascending aorta (4.7cm). His primary care provider, Arsenio Street NP, noted a murmur on cardiac auscultation. He was subsequently referred for an echocardiogram at [facility] on 03/22/17 which revealed an EF of 60%, a mildly dilated left atrium, mild-to-moderate aortic regurgitation, and a dilated ascending aorta measuring 4.3cm. He is very physically fit and exercises daily. He has resting bradycardia as a result of his cardiovascular fitness level. We had an extensive discussion about the results of the echocardiogram and the clinical importance of the dilated ascending aorta. I referred him to a cardiothoracic surgeon, Bc Banegas MD, who specialized in thoracic aortic surgery.  He had a CT aortogram on 08/02/21 which revealed aortic root 4.9 cm, ascending aorta 4.4 cm, and moderate multivessel coronary calcifications. He was not able to tolerate low-dose Toprol XL secondary to an episode of weakness and near syncope.  On 05/05/22, he had an echocardiogram which revealed an EF of 71%, dilated aortic root (4.5cm, 2.2 cm/m2), dilated proximal ascending aorta (4.4 cm, 2.1 cm/m2), and moderate AI.  At present, he feels well and denies cardiovascular complaints.  \par \par *******\par He prefers 6 month f/u.\par

## 2023-04-20 ENCOUNTER — APPOINTMENT (OUTPATIENT)
Dept: HEART AND VASCULAR | Facility: CLINIC | Age: 68
End: 2023-04-20
Payer: MEDICARE

## 2023-04-20 PROCEDURE — 93306 TTE W/DOPPLER COMPLETE: CPT

## 2023-04-24 ENCOUNTER — TRANSCRIPTION ENCOUNTER (OUTPATIENT)
Age: 68
End: 2023-04-24

## 2023-07-27 ENCOUNTER — OUTPATIENT (OUTPATIENT)
Dept: OUTPATIENT SERVICES | Facility: HOSPITAL | Age: 68
LOS: 1 days | End: 2023-07-27

## 2023-07-27 ENCOUNTER — APPOINTMENT (OUTPATIENT)
Dept: CT IMAGING | Facility: CLINIC | Age: 68
End: 2023-07-27
Payer: MEDICARE

## 2023-07-27 PROCEDURE — 71275 CT ANGIOGRAPHY CHEST: CPT | Mod: 26

## 2023-08-02 ENCOUNTER — APPOINTMENT (OUTPATIENT)
Dept: CARDIOTHORACIC SURGERY | Facility: CLINIC | Age: 68
End: 2023-08-02
Payer: MEDICARE

## 2023-08-02 VITALS
HEART RATE: 58 BPM | RESPIRATION RATE: 16 BRPM | DIASTOLIC BLOOD PRESSURE: 68 MMHG | HEIGHT: 70 IN | OXYGEN SATURATION: 97 % | SYSTOLIC BLOOD PRESSURE: 142 MMHG

## 2023-08-02 PROCEDURE — 99213 OFFICE O/P EST LOW 20 MIN: CPT

## 2023-08-05 NOTE — DATA REVIEWED
[FreeTextEntry1] : As noted in HPI.   CTA chest 5/24/19: aortic root 4.7cm, ascending aorta 4.2cm, moderate LA calcifications.    exercise stress echo 08/08/19: EF 62%, dilated aortic root 4.3cm, dilated prox ascending aorta 4.6cm, mild AI, normal wall motion, PA pressures, and ECG post 13.9 METS.    Echo 6/1/20: EF normal, moderate AR (mildly increased since 2018), aortic root 4.2cm.    CTA chest 8/4/20:  1. Since May 24, 2019, unchanged thoracic aortic measurements as above. Unchanged dilatation aortic root, up to 4.7 cm.  2. Moderate multivessel coronary artery calcific atherosclerosis.    Gated CTA chest 8/2/21:  Aortic measurements as follows:  * Aortic root (sinuses of Valsalva): 4.9 x 4.7 cm (coronal by sagittal planes), previously 4.7 x 4.5 cm  * Mid ascending aorta: 4.4 x 4.4 cm, previously up to 4.2 cm  * Aortic arch: 3.1 cm sagittal plane  * Descending thoracic aorta (proximal): 2.4 cm sagittal plane  * Suprarenal aorta: 2.4 x 2.4 cm  My measurement of the aortic root is 4.6-4.7cm; stable and no change from previous CT.    TTE 4/16/21:  structurally normal trileaflet aortic valve with normal opening. there is moderate to severe aortic regurgitation. EF 66%. LVIDd 4.82cm.    Echo 5/5/22:  1. Normal left and right ventricular size and systolic function  2. Normal left ventricular diastolic function and filing pressure  3. Dilated aortic root. 4.5 cm, 2.2 cm/m2  4. Dilated proximal ascending aorta 4.4 cm, 2.1 cm/m2  5. Moderate aortic regurgitation      Cta chest 7/28/22  Aortic measurements as follows:  * Aortic root (sinuses of Valsalva): 4.9 x 4.7 cm, unchanged  * Mid ascending aorta: 4.5 x 4.5 cm, previously 4.4 x 4.4 cm August 2, 2021, measured 4.2 cm on August 4, 2020  * Aortic arch: 3.1 cm, unchanged  * Descending thoracic aorta (proximal): 2.4 cm, unchanged  * Suprarenal aorta: 2.4 x 2.4 cm, unchanged  No aortic dissection or aortic atherosclerosis. Moderate multivessel coronary artery calcific atherosclerosis. No pulmonary embolism.    Since August 2, 2021, minimally increased fusiform dilatation mid ascending thoracic aorta. Unchanged dilated aortic root.

## 2023-08-05 NOTE — ASSESSMENT
[FreeTextEntry1] : 67 year male with a PMH of HIV (on treatment, last viral load undetectable), HTN, dyslipidemia, present today for a 1 year followup visit for evaluation and management of aortic regurgitation and a dilated ascending aorta with repeat diagnostic imaging.    Dr. Jenkins has reviewed all his recent studies including echo and CTA.  His aortic dilatation is closely monitored; at this point of care, it is measured 4.5x4.6cm at the Sinuses of Valsalva.    I have reviewed the patient's medical records, diagnostic images during the time of this office consultation and have made the following recommendation. CT was completed of the thoracic aorta. The report was reviewed and noted in the chart, I independently reviewed and interpreted images and report and I reviewed the films/CD and agree.  Review of the imaging shows his aortic pathology has remained stable and does not require surgical intervention.  Plan  - [facility] with CTA and TTE. - Continue medication regimen. - Follow up with cardiologist and PCP. - Blood pressure management. - Discussed signs and symptoms that warrant emergency medical attention.

## 2023-08-05 NOTE — HISTORY OF PRESENT ILLNESS
[FreeTextEntry1] : 67 year male with a PMH of HIV (on treatment, last viral load undetectable), HTN, and dyslipidemia, present today for a 1 year follow-up visit for evaluation and management of aortic regurgitation and a dilated ascending aorta with repeat diagnostic imaging.   TTE 4/20/23. 1. Normal left ventricular cavity size, wall thickness, and systolic function, LV EF 66%. 2. Normal left ventricular diastolic function and filling pressure. 3. Normal right ventricular cavity size, wall thickness, and systolic function. 4. Normal atria. 5. Aortic sclerosis, mild-to-moderate aortic regurgitation. 6. Mild-to-moderate tricuspid regurgitation. 7. No pericardial effusion seen. 8. Dilated aortic root, 4.6 cm, 2.3 cm/m2. 9. Dilated proximal ascending aorta, 4.4 cm, 2.2 cm/m2.  CTA chest 7/27/23 1.  Borderline aneurysmal dilatation of the ascending aorta. Stable. Sinuses of Valsalva: 4.8 x 4.9 cm Ascending aorta: 4.7 x 4.8 cm Aortic arch: 3.2 cm Mid descending thoracic aorta: 2.8 x 2.9 cm Distal intrathoracic aorta: 2.7 x 2.8 cm 2.  No acute lung disease My assessment of the aortic root is 4.6x4.5cm.   Upon his office visit, Dr. Jenkins has reviewed all his recent studies including echo and CTA.  His aortic dilatation is closely monitored; at this point of care, it is measured 4.5x4.6cm at the Sinuses of Valsalva.  Patient is doing well and denies recent hospitalization, ER visits, or surgeries. He  denies fever, chills, fatigue, headache, blurred vision, dizziness, syncope, chest pain, palpitations, shortness of breath, orthopnea, paroxysmal nocturnal dyspnea, nausea, vomiting, abdominal pain, back pain, BRBPR, swelling to legs or numbness or loss of sensation.

## 2023-08-05 NOTE — PROCEDURE
[FreeTextEntry1] : Patient was advised to view the educational video prior to this visit regarding aortic pathology, risk factors, surgical procedures, and lifestyle modifications. Video can be retrieved at [email or web address].\par

## 2023-08-05 NOTE — END OF VISIT
[Time Spent: ___ minutes] : I have spent [unfilled] minutes of time on the encounter. [FreeTextEntry3] : I, BUBBA MORRISSEY , am scribing for and in the presence of ABY ADORNO the following sections: History of present illness, past Medical/family/surgical/family/social history, review of systems, vital signs, physical exam and disposition. I personally performed the services described in the documentation, reviewed the documentation recorded by the scribe in my presence and it accurately and completely records my words and actions.

## 2023-10-10 ENCOUNTER — NON-APPOINTMENT (OUTPATIENT)
Age: 68
End: 2023-10-10

## 2023-10-10 ENCOUNTER — APPOINTMENT (OUTPATIENT)
Dept: HEART AND VASCULAR | Facility: CLINIC | Age: 68
End: 2023-10-10
Payer: MEDICARE

## 2023-10-10 VITALS — SYSTOLIC BLOOD PRESSURE: 132 MMHG | DIASTOLIC BLOOD PRESSURE: 74 MMHG

## 2023-10-10 VITALS
HEART RATE: 62 BPM | TEMPERATURE: 97.7 F | HEIGHT: 70 IN | WEIGHT: 172.44 LBS | BODY MASS INDEX: 24.69 KG/M2 | OXYGEN SATURATION: 97 % | DIASTOLIC BLOOD PRESSURE: 82 MMHG | SYSTOLIC BLOOD PRESSURE: 149 MMHG

## 2023-10-10 PROCEDURE — 99214 OFFICE O/P EST MOD 30 MIN: CPT | Mod: 25

## 2024-04-09 ENCOUNTER — APPOINTMENT (OUTPATIENT)
Dept: HEART AND VASCULAR | Facility: CLINIC | Age: 69
End: 2024-04-09
Payer: MEDICARE

## 2024-04-09 ENCOUNTER — NON-APPOINTMENT (OUTPATIENT)
Age: 69
End: 2024-04-09

## 2024-04-09 VITALS
HEART RATE: 60 BPM | HEIGHT: 70 IN | WEIGHT: 172.25 LBS | OXYGEN SATURATION: 97 % | SYSTOLIC BLOOD PRESSURE: 127 MMHG | BODY MASS INDEX: 24.66 KG/M2 | DIASTOLIC BLOOD PRESSURE: 60 MMHG

## 2024-04-09 DIAGNOSIS — N18.30 CHRONIC KIDNEY DISEASE, STAGE 3 UNSPECIFIED: ICD-10-CM

## 2024-04-09 DIAGNOSIS — I71.20 THORACIC AORTIC ANEURYSM, WITHOUT RUPTURE, UNSPECIFIED: ICD-10-CM

## 2024-04-09 DIAGNOSIS — I65.29 OCCLUSION AND STENOSIS OF UNSPECIFIED CAROTID ARTERY: ICD-10-CM

## 2024-04-09 DIAGNOSIS — I35.1 NONRHEUMATIC AORTIC (VALVE) INSUFFICIENCY: ICD-10-CM

## 2024-04-09 DIAGNOSIS — R00.1 BRADYCARDIA, UNSPECIFIED: ICD-10-CM

## 2024-04-09 PROCEDURE — 93000 ELECTROCARDIOGRAM COMPLETE: CPT

## 2024-04-09 PROCEDURE — 99215 OFFICE O/P EST HI 40 MIN: CPT | Mod: 25

## 2024-04-09 PROCEDURE — 93306 TTE W/DOPPLER COMPLETE: CPT | Mod: 59

## 2024-04-09 NOTE — ASSESSMENT
[FreeTextEntry1] : ======================================================================================= 1. HTN: BP at ACC/AHA 2017 guideline target, not tolerant of low-dose beta blocker secondary to bradycardia:              - continue telmisartan/hct 80/25mg po daily  2. Hyperlipemia: LDL at goal, LDL 72 (02/27/24):              - continue off statins at this time given new ART regimen  3. Aortic regurgitation: moderate-to-severe AI:              - follow up [facility] with Eyal Jenkins MD             - will follow with serial echocardiograms   4. Dilated aortic root, dilated ascending aorta, and aortic arch: 4.9 cm maximum:              - not tolerant of low-dose beta blocker secondary to bradycardia             - follow up with [facility] aortic center with Eyal Jenkins MD             - will follow with serial echocardiograms and periodic CTA   5. HIV disease:               - continue Juluca 50/25mg po qd              - f/u with HIV specialist  6. Marked sinus bradycardia: likely secondary to high resting vagal tone from CV fitness: + occasional symptoms             - avoid HR slowing agents             - s/p 07/29/19 to 08/05/19: Lifewatch MCT: an HR 54, 79% bradycardia, no pauses.   7. CKD III: Cr 1.3, eGFR 60 (02/27/24):              - avoid nephrotoxic agents             - will send to a nephrologist, Sae Morataya MD (contact information given in the past)  8. CAD: coronary artery calcifications noted on CT aortogram:             - he would like to continue off statins at this time            - will send for a coronary artery angiogram when time for aorta/aortic valve intervention  I spent > 45 minutes of total time on this visit, including time spent face-to-face and non-face-to-face.  During this time, I took a relevant history and examined the patient.  I reviewed relevant portions of the medical record and formulated a differential diagnosis and plan.  I explained the relevant cardiac diagnoses to the patient, as well as the work up and management plan.  I answered all questions related to the patient's cardiac conditions.

## 2024-04-09 NOTE — HISTORY OF PRESENT ILLNESS
[FreeTextEntry1] : Mr. Lipscomb presents for follow up and management of HIV, HTN, dyslipidemia, CKD III, aortic regurgitation, and a dilated ascending aorta (4.7cm). He is very physically fit and exercises daily. He has resting bradycardia as a result of his cardiovascular fitness level.  He was not able to tolerate low-dose Toprol XL secondary to an episode of weakness and near syncope.  Today, 04/09/24, he had an echocardiogram which revealed an EF of 66%, dilated aortic root (4.6 cm, 2.4 cm/m2), dilated ascending aorta 4.9 cm, 2.5 cm/m2), dilated aortic arch 4.3 cm, 2.2 cm/m2), and moderate-to-severe AI.  We discussed that this represents an increase in the diameter of his thoracic aorta.  He is scheduled to the cardiothoracic aortic surgeon, Eyal Jenkins MD, and have a repeat CT in a couple of months.    ******************************************************************************** He prefers 6 month f/u.

## 2024-04-09 NOTE — REASON FOR VISIT
[Other: ____] : [unfilled] [FreeTextEntry1] : ======================================================================================= Diagnostic Tests: -------------------------------------------------------------- EC24: sinus bradycardia at 57 bpm, frequent APCs (atrial trigeminy).  10/10/23: sinus bradycardia at 57 bpm, frequent APCs.  23: sinus rhythm, frequent APCs.   10/11/22: sinus bradycardia at 54 bpm, frequent PVCs.  22: sinus rhythm, frequent APCs.  10/11/21: sinus rhythm, frequent APCs.  21: sinus bradycardia at 58 bpm, single PVC.  20: marked sinus bradycardia at 49bpm, otherwise normal.  19: marked sinus bradycardia at 46bpm, otherwise normal.   12/10/18: sinus favian at 58bpm, 2 PVCs.  18: sinus bradycardia, otherwise normal.  17: sinus bradycardia, otherwise normal.  -------------------------------------------------------------- Echo: 24: EF 66%, dilated aortic root (4.6 cm, 2.4 cm/m2), dilated ascending aorta 4.9 cm, 2.5 cm/m2), dilated aortic arch 4.3 cm, 2.2 cm/m2), moderate-to-severe AI.  22: EF 71%, dilated aortic root (4.5cm, 2.2 cm/m2), dilated proximal ascending aorta (4.4 cm, 2.1 cm/m2), moderate AI.  21: EF 66%, aortic root 4.2 cm, mod-severe AI 20: EF 66%, dilated aortic root 4.2cm, moderate AI.   18: EF 65%, dilated LA, aortic root 4.2cm, mild-moderate AI.  17: EF 60%, mild-moderate AI, mildly dilated LA, dilated ascending aorta 4.3cm. -------------------------------------------------------------- CT: 23: CTA chest: aortic root 4.9 cm, ascending aorta 4.8 cm.  22: CTA chest: aortic root 4.9 cm, ascending aorta 4.5cm.  21: CTA chest: aortic root 4.9 cm, ascending aorta 4.4 cm, moderate multivessel coronary calcifications. 20: CTA chest: aortic root 4.7cm, ascending aorta 4.2cm, moderate LA calcifications. 19: CTA chest: aortic root 4.7cm, ascending aorta 4.2cm, moderate LA calcifications.  17: CTA aorta: dilated aortic root and ascending aorta, 4.7cm at sinuses of Valsalva, 4.3 sino-tubular junction, 4.3 at RPA.  17: CTA aorta: dilated aortic root and ascending aorta, 4.7cm at sinuses of Valsalva. -------------------------------------------------------------- Stress: 19: exercise stress echo: EF 62%, dilated aortic root 4.3cm, dilated prox ascending aorta 4.6cm, mild AI, normal wall motion, PA pressures, and ECG post 13.9 METS.  17: exercise stress echo: EF 62%, 14.8 METS, normal wall motion and PA pressures, aortic root 4.2cm, mild AI. -------------------------------------------------------------- Vascular: 21: Carotid: 1-19% stenosis b/l -------------------------------------------------------------- Monitor: 19 to 19:  Lifewatch MCT: an HR 54, 79% bradycardia, no pauses.

## 2024-08-05 ENCOUNTER — APPOINTMENT (OUTPATIENT)
Dept: CT IMAGING | Facility: CLINIC | Age: 69
End: 2024-08-05

## 2024-08-05 ENCOUNTER — OUTPATIENT (OUTPATIENT)
Dept: OUTPATIENT SERVICES | Facility: HOSPITAL | Age: 69
LOS: 1 days | End: 2024-08-05

## 2024-08-05 PROCEDURE — 71275 CT ANGIOGRAPHY CHEST: CPT | Mod: 26

## 2024-08-06 ENCOUNTER — NON-APPOINTMENT (OUTPATIENT)
Age: 69
End: 2024-08-06

## 2024-08-07 ENCOUNTER — APPOINTMENT (OUTPATIENT)
Dept: CARDIOTHORACIC SURGERY | Facility: CLINIC | Age: 69
End: 2024-08-07

## 2024-08-07 PROCEDURE — 99214 OFFICE O/P EST MOD 30 MIN: CPT

## 2024-08-09 NOTE — ASSESSMENT
[FreeTextEntry1] : 68 year male with a PMH of HIV (on treatment, last viral load undetectable), HTN, and dyslipidemia, present today for a 1 year follow-up visit for evaluation and management of aortic regurgitation and a dilated ascending aorta with repeat diagnostic imaging.  I have reviewed the patient's medical records, diagnostic images during the time of this office consultation and have made the following recommendation. CT and TTE were completed of the thoracic aorta. The reports were reviewed and noted in the chart, I independently reviewed and interpreted images and reports, and I reviewed the films/CD and agree.  Review of the imaging shows his aortic pathology has remained stable and does not require surgical intervention.  Plan - Follow up in [facility] 2 years with CTA chest and TTE.  - Continue medication regimen. - Follow up with cardiologist Dr. Urias and PCP. - Blood pressure management. - Discussed signs and symptoms that warrant emergency medical attention.

## 2024-08-09 NOTE — DATA REVIEWED
[FreeTextEntry1] : CTA chest 5/24/19: aortic root 4.7cm, ascending aorta 4.2cm, moderate LA calcifications.  exercise stress echo 08/08/19: EF 62%, dilated aortic root 4.3cm, dilated prox ascending aorta 4.6cm, mild AI, normal wall motion, PA pressures, and ECG post 13.9 METS.  Echo 6/1/20: EF normal, moderate AR (mildly increased since 2018), aortic root 4.2cm.  CTA chest 8/4/20: 1. Since May 24, 2019, unchanged thoracic aortic measurements as above. Unchanged dilatation aortic root, up to 4.7 cm. 2. Moderate multivessel coronary artery calcific atherosclerosis.  Gated CTA chest 8/2/21: Aortic measurements as follows: * Aortic root (sinuses of Valsalva): 4.9 x 4.7 cm (coronal by sagittal planes), previously 4.7 x 4.5 cm * Mid ascending aorta: 4.4 x 4.4 cm, previously up to 4.2 cm * Aortic arch: 3.1 cm sagittal plane * Descending thoracic aorta (proximal): 2.4 cm sagittal plane * Suprarenal aorta: 2.4 x 2.4 cm  My measurement of the aortic root is 4.6-4.7cm; stable and no change from previous CT. TTE 4/20/23. 1. Normal left ventricular cavity size, wall thickness, and systolic function, LV EF 66%. 2. Normal left ventricular diastolic function and filling pressure. 3. Normal right ventricular cavity size, wall thickness, and systolic function. 4. Normal atria. 5. Aortic sclerosis, mild-to-moderate aortic regurgitation. 6. Mild-to-moderate tricuspid regurgitation. 7. No pericardial effusion seen. 8. Dilated aortic root, 4.6 cm, 2.3 cm/m2. 9. Dilated proximal ascending aorta, 4.4 cm, 2.2 cm/m2.  CTA chest 7/27/23 1. Borderline aneurysmal dilatation of the ascending aorta. Stable. Sinuses of Valsalva: 4.8 x 4.9 cm Ascending aorta: 4.7 x 4.8 cm Aortic arch: 3.2 cm Mid descending thoracic aorta: 2.8 x 2.9 cm Distal intrathoracic aorta: 2.7 x 2.8 cm 2. No acute lung disease My assessment of the aortic root is 4.6x4.5cm.  TTE 4/16/21: structurally normal trileaflet aortic valve with normal opening. there is moderate to severe aortic regurgitation. EF 66%. LVIDd 4.82cm.  Echo 5/5/22: 1. Normal left and right ventricular size and systolic function 2. Normal left ventricular diastolic function and filing pressure 3. Dilated aortic root. 4.5 cm, 2.2 cm/m2 4. Dilated proximal ascending aorta 4.4 cm, 2.1 cm/m2 5. Moderate aortic regurgitation  Cta chest 7/28/22 Aortic measurements as follows: * Aortic root (sinuses of Valsalva): 4.9 x 4.7 cm, unchanged * Mid ascending aorta: 4.5 x 4.5 cm, previously 4.4 x 4.4 cm August 2, 2021, measured 4.2 cm on August 4, 2020 * Aortic arch: 3.1 cm, unchanged * Descending thoracic aorta (proximal): 2.4 cm, unchanged * Suprarenal aorta: 2.4 x 2.4 cm, unchanged No aortic dissection or aortic atherosclerosis. Moderate multivessel coronary artery calcific atherosclerosis. No pulmonary embolism.  Since August 2, 2021, minimally increased fusiform dilatation mid ascending thoracic aorta. Unchanged dilated aortic root.

## 2024-08-09 NOTE — PHYSICAL EXAM
[Sclera] : the sclera and conjunctiva were normal [PERRL With Normal Accommodation] : pupils were equal in size, round, and reactive to light [Neck Appearance] : the appearance of the neck was normal [] : no respiratory distress [Auscultation Breath Sounds / Voice Sounds] : lungs were clear to auscultation bilaterally [Heart Rate And Rhythm] : heart rate was normal and rhythm regular [Heart Sounds] : normal S1 and S2 [Heart Sounds Gallop] : no gallops [Murmurs] : no murmurs [Heart Sounds Pericardial Friction Rub] : no pericardial rub [2+] : left 2+ [Bowel Sounds] : normal bowel sounds [Abdomen Soft] : soft [Abdomen Tenderness] : non-tender [Abnormal Walk] : normal gait [Skin Color & Pigmentation] : normal skin color and pigmentation [Cranial Nerves] : cranial nerves 2-12 were intact [Oriented To Time, Place, And Person] : oriented to person, place, and time [Right Carotid Bruit] : no bruit heard over the right carotid [Left Carotid Bruit] : no bruit heard over the left carotid [FreeTextEntry2] : No LE edema  [FreeTextEntry1] : Deferred

## 2024-08-09 NOTE — PROCEDURE
[FreeTextEntry1] : Dr. Jenkins reviewed the indications for surgery, and used our webpage [email or web address] <[email or web address]> to illustrate the aorta and anatomy of the heart. Those indications are the following: size greater than 5.0 cm, symptomatic aneurysms, family history of aortic dissection or aneurysm death with a size greater than 4.5 cm, other necessary cardiac procedures such as coronary artery bypass grafting or valvular disorders with an aneurysm greater than 4.5 cm, or connective tissue disorders with an aneurysm size greater than 4.5 cm. The patient does not meet size criteria for surgical intervention at the time.  Dr. Jenkins discussed activity restrictions with the patient, and would advise exercise at a moderate amount with no heavy lifting over one third of body weight, and avoiding heart rates that exceed 140 beats per minute. In addition, every patient should abstain from tobacco abuse and to avoid all illicit drug use, especially stimulants such as cocaine or methamphetamine. Dr. Jenkins also counseled regarding maintaining a healthy heart diet, and losing any excessive weight as this also put undue stress on both the aorta and entire cardiovascular system. First degree family members should be screened for bicuspid valve disease, and ascending aortic aneurysms.   Patient was advised to view the educational video prior to this visit regarding aortic pathology, risk factors, surgical procedures, and lifestyle modifications. Video can be retrieved at [email or web address].

## 2024-08-09 NOTE — PROCEDURE
[FreeTextEntry1] : Dr. Jenkins reviewed the indications for surgery, and used our webpage [email or web address] <[email or web address]> to illustrate the aorta and anatomy of the heart. Those indications are the following: size greater than 5.0 cm, symptomatic aneurysms, family history of aortic dissection or aneurysm death with a size greater than 4.5 cm, other necessary cardiac procedures such as coronary artery bypass grafting or valvular disorders with an aneurysm greater than 4.5 cm, or connective tissue disorders with an aneurysm size greater than 4.5 cm. The patient does not meet size criteria for surgical intervention at the time.  Dr. Jenkins discussed activity restrictions with the patient, and would advise exercise at a moderate amount with no heavy lifting over one third of body weight, and avoiding heart rates that exceed 140 beats per minute. In addition, every patient should abstain from tobacco abuse and to avoid all illicit drug use, especially stimulants such as cocaine or methamphetamine. Dr. Jenkins also counseled regarding maintaining a healthy heart diet, and losing any excessive weight as this also put undue stress on both the aorta and entire cardiovascular system. First degree family members should be screened for bicuspid valve disease, and ascending aortic aneurysms.   Patient was advised to view the educational video prior to this visit regarding aortic pathology, risk factors, surgical procedures, and lifestyle modifications. Video can be retrieved at [email or web address].

## 2024-08-09 NOTE — PROCEDURE
[FreeTextEntry1] : Dr. Jenkins reviewed the indications for surgery, and used our webpage [email or web address].org <[email or web address].org> to illustrate the aorta and anatomy of the heart. Those indications are the following: size greater than 5.0 cm, symptomatic aneurysms, family history of aortic dissection or aneurysm death with a size greater than 4.5 cm, other necessary cardiac procedures such as coronary artery bypass grafting or valvular disorders with an aneurysm greater than 4.5 cm, or connective tissue disorders with an aneurysm size greater than 4.5 cm. The patient does not meet size criteria for surgical intervention at the time.  Dr. Jenkins discussed activity restrictions with the patient, and would advise exercise at a moderate amount with no heavy lifting over one third of body weight, and avoiding heart rates that exceed 140 beats per minute. In addition, every patient should abstain from tobacco abuse and to avoid all illicit drug use, especially stimulants such as cocaine or methamphetamine. Dr. Jenkins also counseled regarding maintaining a healthy heart diet, and losing any excessive weight as this also put undue stress on both the aorta and entire cardiovascular system. First degree family members should be screened for bicuspid valve disease, and ascending aortic aneurysms.   Patient was advised to view the educational video prior to this visit regarding aortic pathology, risk factors, surgical procedures, and lifestyle modifications. Video can be retrieved at [email or web address].com/watch?[email or web address].be.

## 2024-08-09 NOTE — HISTORY OF PRESENT ILLNESS
[FreeTextEntry1] : 68 year male with a PMH of HIV (on treatment, last viral load undetectable), HTN, and dyslipidemia, present today for a 1 year follow-up visit for evaluation and management of aortic regurgitation and a dilated ascending aorta with repeat diagnostic imaging.  CTA chest 8/5/24: The ascending thoracic aorta measures 4.9 cm ascending thoracic aorta just distal to the sinuses of Valsalva measures 4.8 cm ascending thoracic aorta at the level of the main pulmonary artery measures 4.4 cm  aortic arch just distal to the left subclavian artery measures 2.5 cm descending thoracic aorta at the level of the main pulmonary artery measures 2.4 cm ascending thoracic aorta at the level of the diaphragmatic hiatus measures 2.5 cm,  All stable since 8/2021.  Negative for stenosis of the proximal aspect of the great vessels.  * My measurements: ascending aorta 4.5cm, Descending aorta 2.5cm   TTE 4/9/2024 1. Normal left ventricular cavity size, wall thickness, and systolic function, LV EF 66%. 2. Normal left ventricular diastolic function, with normal filling pressure. 3. Normal right ventricular cavity size, with normal wall thickness, and normal systolic function. 4. Mildly dilated left and right atria. 5. Moderate-to-severe aortic regurgitation. 6. Aortic root at the sinuses of Valsalva is dilated, measuring 4.6 cm (indexed 2.4 cm/m). 7. Ascending aorta diameter is dilated, measuring 4.9 cm (indexed 2.5 cm/m). 8. Aortic arch diameter is dilated, measuring 4.3 cm (indexed 2.2 cm/m). 9. No pericardial effusion seen. 10. Compared to the transthoracic echocardiogram performed on 4/5/2022, the thoracic aorta has increased in size.  Patient is doing well and denies recent hospitalization, ER visits, or surgeries. Exercises strenuously 5 days per week. He denies fever, chills, fatigue, headache, blurred vision, dizziness, syncope, chest pain, palpitations, shortness of breath, orthopnea, paroxysmal nocturnal dyspnea, nausea, vomiting, abdominal pain, back pain, BRBPR or swelling to legs.

## 2024-08-09 NOTE — END OF VISIT
[Time Spent: ___ minutes] : I have spent [unfilled] minutes of time on the encounter. [FreeTextEntry3] : I, ABY Markham personally performed the evaluation and management (E/M) services for this established patient who presents today with (a) new problem(s)/exacerbation of (an) existing condition(s).  That E/M includes conducting the clinically appropriate interval history &/or exam, assessing all new/exacerbated conditions, and establishing a new plan of care.  Today, my ESTEBAN, was here to observe &/or participate in the visit & follow plan of care established by me.

## 2024-10-08 ENCOUNTER — APPOINTMENT (OUTPATIENT)
Dept: HEART AND VASCULAR | Facility: CLINIC | Age: 69
End: 2024-10-08
Payer: MEDICARE

## 2024-10-08 ENCOUNTER — NON-APPOINTMENT (OUTPATIENT)
Age: 69
End: 2024-10-08

## 2024-10-08 VITALS
DIASTOLIC BLOOD PRESSURE: 66 MMHG | SYSTOLIC BLOOD PRESSURE: 133 MMHG | OXYGEN SATURATION: 100 % | WEIGHT: 170 LBS | HEIGHT: 70 IN | HEART RATE: 59 BPM | BODY MASS INDEX: 24.34 KG/M2

## 2024-10-08 DIAGNOSIS — I65.29 OCCLUSION AND STENOSIS OF UNSPECIFIED CAROTID ARTERY: ICD-10-CM

## 2024-10-08 DIAGNOSIS — I71.20 THORACIC AORTIC ANEURYSM, WITHOUT RUPTURE, UNSPECIFIED: ICD-10-CM

## 2024-10-08 DIAGNOSIS — I35.1 NONRHEUMATIC AORTIC (VALVE) INSUFFICIENCY: ICD-10-CM

## 2024-10-08 DIAGNOSIS — N18.30 CHRONIC KIDNEY DISEASE, STAGE 3 UNSPECIFIED: ICD-10-CM

## 2024-10-08 DIAGNOSIS — R00.1 BRADYCARDIA, UNSPECIFIED: ICD-10-CM

## 2024-10-08 PROCEDURE — 99215 OFFICE O/P EST HI 40 MIN: CPT

## 2024-10-08 PROCEDURE — G2211 COMPLEX E/M VISIT ADD ON: CPT

## 2024-10-08 PROCEDURE — 93000 ELECTROCARDIOGRAM COMPLETE: CPT

## 2025-08-29 ENCOUNTER — APPOINTMENT (OUTPATIENT)
Dept: ULTRASOUND IMAGING | Facility: CLINIC | Age: 70
End: 2025-08-29
Payer: MEDICARE

## 2025-08-29 ENCOUNTER — OUTPATIENT (OUTPATIENT)
Dept: OUTPATIENT SERVICES | Facility: HOSPITAL | Age: 70
LOS: 1 days | End: 2025-08-29

## 2025-08-29 PROCEDURE — 76700 US EXAM ABDOM COMPLETE: CPT | Mod: 26

## 2025-09-19 ENCOUNTER — NON-APPOINTMENT (OUTPATIENT)
Age: 70
End: 2025-09-19